# Patient Record
Sex: FEMALE | Race: WHITE | Employment: OTHER | URBAN - METROPOLITAN AREA
[De-identification: names, ages, dates, MRNs, and addresses within clinical notes are randomized per-mention and may not be internally consistent; named-entity substitution may affect disease eponyms.]

---

## 2018-12-08 ENCOUNTER — APPOINTMENT (OUTPATIENT)
Dept: GENERAL RADIOLOGY | Age: 73
DRG: 640 | End: 2018-12-08
Attending: EMERGENCY MEDICINE
Payer: MEDICARE

## 2018-12-08 ENCOUNTER — APPOINTMENT (OUTPATIENT)
Dept: CT IMAGING | Age: 73
DRG: 640 | End: 2018-12-08
Attending: EMERGENCY MEDICINE
Payer: MEDICARE

## 2018-12-08 ENCOUNTER — HOSPITAL ENCOUNTER (INPATIENT)
Age: 73
LOS: 5 days | Discharge: NURSING, REHAB OR LONG-TERM CARE | DRG: 640 | End: 2018-12-13
Attending: EMERGENCY MEDICINE | Admitting: INTERNAL MEDICINE
Payer: MEDICARE

## 2018-12-08 DIAGNOSIS — R41.3 MEMORY LOSS: ICD-10-CM

## 2018-12-08 DIAGNOSIS — R29.6 FALLS FREQUENTLY: ICD-10-CM

## 2018-12-08 DIAGNOSIS — G25.0 ESSENTIAL TREMOR: ICD-10-CM

## 2018-12-08 DIAGNOSIS — R26.9 GAIT ABNORMALITY: ICD-10-CM

## 2018-12-08 DIAGNOSIS — E87.1 HYPONATREMIA: Primary | ICD-10-CM

## 2018-12-08 DIAGNOSIS — Z96.89 S/P DEEP BRAIN STIMULATOR PLACEMENT: ICD-10-CM

## 2018-12-08 PROBLEM — R26.81 UNSTEADY GAIT: Status: ACTIVE | Noted: 2018-12-08
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
## 2018-12-08 PROBLEM — E78.5 HYPERLIPIDEMIA: Status: ACTIVE | Noted: 2018-12-08

## 2018-12-08 PROBLEM — E03.9 ACQUIRED HYPOTHYROIDISM: Status: ACTIVE | Noted: 2018-12-08

## 2018-12-08 LAB
ALBUMIN SERPL-MCNC: 4.1 G/DL (ref 3.5–5)
ALBUMIN/GLOB SERPL: 1.4 {RATIO} (ref 1.1–2.2)
ALP SERPL-CCNC: 81 U/L (ref 45–117)
ALT SERPL-CCNC: 33 U/L (ref 12–78)
ANION GAP SERPL CALC-SCNC: 10 MMOL/L (ref 5–15)
APPEARANCE UR: CLEAR
AST SERPL-CCNC: 31 U/L (ref 15–37)
BACTERIA URNS QL MICRO: NEGATIVE /HPF
BASOPHILS # BLD: 0 K/UL (ref 0–0.1)
BASOPHILS NFR BLD: 0 % (ref 0–1)
BILIRUB SERPL-MCNC: 0.4 MG/DL (ref 0.2–1)
BILIRUB UR QL: NEGATIVE
BUN SERPL-MCNC: 11 MG/DL (ref 6–20)
BUN/CREAT SERPL: 14 (ref 12–20)
CALCIUM SERPL-MCNC: 9.2 MG/DL (ref 8.5–10.1)
CHLORIDE SERPL-SCNC: 85 MMOL/L (ref 97–108)
CO2 SERPL-SCNC: 26 MMOL/L (ref 21–32)
COLOR UR: ABNORMAL
COMMENT, HOLDF: NORMAL
CREAT SERPL-MCNC: 0.76 MG/DL (ref 0.55–1.02)
DIFFERENTIAL METHOD BLD: ABNORMAL
EOSINOPHIL # BLD: 0 K/UL (ref 0–0.4)
EOSINOPHIL NFR BLD: 0 % (ref 0–7)
EPITH CASTS URNS QL MICRO: ABNORMAL /LPF
ERYTHROCYTE [DISTWIDTH] IN BLOOD BY AUTOMATED COUNT: 12.3 % (ref 11.5–14.5)
GLOBULIN SER CALC-MCNC: 3 G/DL (ref 2–4)
GLUCOSE SERPL-MCNC: 123 MG/DL (ref 65–100)
GLUCOSE UR STRIP.AUTO-MCNC: NEGATIVE MG/DL
HCT VFR BLD AUTO: 33.1 % (ref 35–47)
HGB BLD-MCNC: 11.8 G/DL (ref 11.5–16)
HGB UR QL STRIP: NEGATIVE
HYALINE CASTS URNS QL MICRO: ABNORMAL /LPF (ref 0–5)
IMM GRANULOCYTES # BLD: 0 K/UL (ref 0–0.04)
IMM GRANULOCYTES NFR BLD AUTO: 1 % (ref 0–0.5)
KETONES UR QL STRIP.AUTO: NEGATIVE MG/DL
LEUKOCYTE ESTERASE UR QL STRIP.AUTO: NEGATIVE
LYMPHOCYTES # BLD: 0.9 K/UL (ref 0.8–3.5)
LYMPHOCYTES NFR BLD: 15 % (ref 12–49)
MAGNESIUM SERPL-MCNC: 2.2 MG/DL (ref 1.6–2.4)
MCH RBC QN AUTO: 31.6 PG (ref 26–34)
MCHC RBC AUTO-ENTMCNC: 35.6 G/DL (ref 30–36.5)
MCV RBC AUTO: 88.7 FL (ref 80–99)
MONOCYTES # BLD: 0.9 K/UL (ref 0–1)
MONOCYTES NFR BLD: 14 % (ref 5–13)
NEUTS SEG # BLD: 4.4 K/UL (ref 1.8–8)
NEUTS SEG NFR BLD: 70 % (ref 32–75)
NITRITE UR QL STRIP.AUTO: NEGATIVE
NRBC # BLD: 0 K/UL (ref 0–0.01)
NRBC BLD-RTO: 0 PER 100 WBC
PH UR STRIP: 6.5 [PH] (ref 5–8)
PLATELET # BLD AUTO: 346 K/UL (ref 150–400)
PMV BLD AUTO: 8.5 FL (ref 8.9–12.9)
POTASSIUM SERPL-SCNC: 3.2 MMOL/L (ref 3.5–5.1)
PROT SERPL-MCNC: 7.1 G/DL (ref 6.4–8.2)
PROT UR STRIP-MCNC: ABNORMAL MG/DL
RBC # BLD AUTO: 3.73 M/UL (ref 3.8–5.2)
RBC #/AREA URNS HPF: ABNORMAL /HPF (ref 0–5)
SAMPLES BEING HELD,HOLD: NORMAL
SODIUM SERPL-SCNC: 121 MMOL/L (ref 136–145)
SP GR UR REFRACTOMETRY: 1.01 (ref 1–1.03)
TROPONIN I SERPL-MCNC: <0.05 NG/ML
UROBILINOGEN UR QL STRIP.AUTO: 0.2 EU/DL (ref 0.2–1)
WBC # BLD AUTO: 6.3 K/UL (ref 3.6–11)
WBC URNS QL MICRO: ABNORMAL /HPF (ref 0–4)

## 2018-12-08 PROCEDURE — 99284 EMERGENCY DEPT VISIT MOD MDM: CPT

## 2018-12-08 PROCEDURE — 93005 ELECTROCARDIOGRAM TRACING: CPT

## 2018-12-08 PROCEDURE — 80053 COMPREHEN METABOLIC PANEL: CPT

## 2018-12-08 PROCEDURE — 65270000029 HC RM PRIVATE

## 2018-12-08 PROCEDURE — 84484 ASSAY OF TROPONIN QUANT: CPT

## 2018-12-08 PROCEDURE — 36415 COLL VENOUS BLD VENIPUNCTURE: CPT

## 2018-12-08 PROCEDURE — 85025 COMPLETE CBC W/AUTO DIFF WBC: CPT

## 2018-12-08 PROCEDURE — 83735 ASSAY OF MAGNESIUM: CPT

## 2018-12-08 PROCEDURE — 70450 CT HEAD/BRAIN W/O DYE: CPT

## 2018-12-08 PROCEDURE — 71046 X-RAY EXAM CHEST 2 VIEWS: CPT

## 2018-12-08 PROCEDURE — 74011250637 HC RX REV CODE- 250/637: Performed by: INTERNAL MEDICINE

## 2018-12-08 PROCEDURE — 77030038269 HC DRN EXT URIN PURWCK BARD -A

## 2018-12-08 PROCEDURE — 81001 URINALYSIS AUTO W/SCOPE: CPT

## 2018-12-08 PROCEDURE — 83930 ASSAY OF BLOOD OSMOLALITY: CPT

## 2018-12-08 RX ORDER — FLUOXETINE HYDROCHLORIDE 20 MG/1
20 CAPSULE ORAL DAILY
Status: DISCONTINUED | OUTPATIENT
Start: 2018-12-09 | End: 2018-12-09

## 2018-12-08 RX ORDER — ENOXAPARIN SODIUM 100 MG/ML
40 INJECTION SUBCUTANEOUS EVERY 24 HOURS
Status: DISCONTINUED | OUTPATIENT
Start: 2018-12-08 | End: 2018-12-13 | Stop reason: HOSPADM

## 2018-12-08 RX ORDER — ATORVASTATIN CALCIUM 20 MG/1
40 TABLET, FILM COATED ORAL DAILY
Status: DISCONTINUED | OUTPATIENT
Start: 2018-12-09 | End: 2018-12-13 | Stop reason: HOSPADM

## 2018-12-08 RX ORDER — FLUTICASONE PROPIONATE 50 MCG
2 SPRAY, SUSPENSION (ML) NASAL
COMMUNITY

## 2018-12-08 RX ORDER — ALPRAZOLAM 0.25 MG/1
0.25 TABLET ORAL
Status: DISCONTINUED | OUTPATIENT
Start: 2018-12-08 | End: 2018-12-13 | Stop reason: HOSPADM

## 2018-12-08 RX ORDER — LEVOTHYROXINE SODIUM 100 UG/1
50 TABLET ORAL
COMMUNITY

## 2018-12-08 RX ORDER — SODIUM CHLORIDE 0.9 % (FLUSH) 0.9 %
5-10 SYRINGE (ML) INJECTION EVERY 8 HOURS
Status: DISCONTINUED | OUTPATIENT
Start: 2018-12-08 | End: 2018-12-13 | Stop reason: HOSPADM

## 2018-12-08 RX ORDER — FLUOXETINE 20 MG/1
20 TABLET ORAL DAILY
COMMUNITY
End: 2018-12-13

## 2018-12-08 RX ORDER — LEVOTHYROXINE SODIUM 100 UG/1
100 TABLET ORAL
Status: DISCONTINUED | OUTPATIENT
Start: 2018-12-09 | End: 2018-12-13 | Stop reason: HOSPADM

## 2018-12-08 RX ORDER — CLOBETASOL PROPIONATE 0.46 MG/ML
SOLUTION TOPICAL
COMMUNITY
End: 2018-12-13

## 2018-12-08 RX ORDER — PROPRANOLOL HYDROCHLORIDE 60 MG/1
60 TABLET ORAL 2 TIMES DAILY
COMMUNITY

## 2018-12-08 RX ORDER — LOPERAMIDE HYDROCHLORIDE 2 MG/1
2 CAPSULE ORAL
Status: DISCONTINUED | OUTPATIENT
Start: 2018-12-08 | End: 2018-12-13 | Stop reason: HOSPADM

## 2018-12-08 RX ORDER — LOPERAMIDE HCL 2 MG
2 TABLET ORAL
COMMUNITY

## 2018-12-08 RX ORDER — SODIUM CHLORIDE 9 MG/ML
75 INJECTION, SOLUTION INTRAVENOUS CONTINUOUS
Status: DISCONTINUED | OUTPATIENT
Start: 2018-12-08 | End: 2018-12-09

## 2018-12-08 RX ORDER — LEVOTHYROXINE SODIUM 50 UG/1
50 TABLET ORAL
Status: DISCONTINUED | OUTPATIENT
Start: 2018-12-12 | End: 2018-12-13 | Stop reason: HOSPADM

## 2018-12-08 RX ORDER — FLUTICASONE PROPIONATE 50 MCG
2 SPRAY, SUSPENSION (ML) NASAL
Status: DISCONTINUED | OUTPATIENT
Start: 2018-12-08 | End: 2018-12-13 | Stop reason: HOSPADM

## 2018-12-08 RX ORDER — POTASSIUM CHLORIDE 750 MG/1
40 TABLET, FILM COATED, EXTENDED RELEASE ORAL
Status: COMPLETED | OUTPATIENT
Start: 2018-12-08 | End: 2018-12-08

## 2018-12-08 RX ORDER — SODIUM CHLORIDE 0.9 % (FLUSH) 0.9 %
5-10 SYRINGE (ML) INJECTION AS NEEDED
Status: DISCONTINUED | OUTPATIENT
Start: 2018-12-08 | End: 2018-12-13 | Stop reason: HOSPADM

## 2018-12-08 RX ADMIN — POTASSIUM CHLORIDE 40 MEQ: 750 TABLET, EXTENDED RELEASE ORAL at 22:03

## 2018-12-08 NOTE — ED TRIAGE NOTES
Pt c/o balance problems, multiple falls (last one was Thursday a week ago). Per daughter, pt has been experiencing gradually worsening short term memory problems

## 2018-12-08 NOTE — ED PROVIDER NOTES
68 y.o. female with past medical history significant for HTN, thyroid disease (on Synthroid), hypercholesterolemia, osteopenia, asthma, s/p pacemaker, s/p brain implant for essential tremor presents accompanied by brother and daughter with chief complaint of worsening balance problems and multiple falls, noting that the last one was on 11/29/18. Pt explains that the falls and balance problems started in late Fall, noting that she has fallen about 4-5 times, and that she injured her back and hit her head during the last fall. Pt reports associated right upper cheek bruising after her last fall. She also reports associated BLE weakness. Family indicates that the pt's memory has been decreasing over the last few months. Family further explains that the pt had almost fallen today, but was caught before hitting the ground. Pt daughter adds that the pt went to the chiropractor this week for her back pain. 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 Pt lives alone. Pt denies any vision changes, epistaxis, facial pain, or LE pain currently. There are no other acute medical concerns at this time. Social hx: Patient denies Tobacco use. Reports 2.5 oz/week of EtOH use. Denies illicit drug abuse. PCP: Dr. Cyndi Daniels MD 
 
Note written by Pushpa Zurita, as dictated by Hal Henry NP, 7:21 PM 
 
 
 
The history is provided by the patient and a relative. No  was used. 76y F with long-standing balance issues here with AMS. Family reports worsening short-term memory for \"some time\" but worse since a fall about 1.5 weeks ago. Has intermittent periods of confusion. Will ramble on about past event, seems like she is in daze. No vomiting. No fever. No recent illnesses. No focal weakness or numbness.  Has been evaluated for balance issues in the past and previously was in balance therapy, but not currently. Nothing makes sx's better or worse. Past Medical History:  
Diagnosis Date  Asthma   
 as a child  Hypercholesterolemia  Hypertension  Ill-defined condition \"essential tremors with implant in chest on Left side under collar bone connected to the brain\"  Nausea & vomiting  Other ill-defined conditions(799.89)   
  high cholesterol, osteopenia  Psychiatric disorder   
 anxiety  Thyroid disease   
 hypothryroidism Past Surgical History:  
Procedure Laterality Date  ABDOMEN SURGERY PROC UNLISTED  1981  
 partial abdominal Hysterectomy, bladder tacking,  
 HX APPENDECTOMY  HX BUNIONECTOMY  HX ORTHOPAEDIC  1986  
 neck fusion,  
 HX ORTHOPAEDIC Right Bunionectomy  HX PACEMAKER Implanted device to L chest that Dr. Annie Pinedo at [facility] put in for essential tremors  HX TONSILLECTOMY No family history on file. Social History Socioeconomic History  Marital status: SINGLE Spouse name: Not on file  Number of children: Not on file  Years of education: Not on file  Highest education level: Not on file Social Needs  Financial resource strain: Not on file  Food insecurity - worry: Not on file  Food insecurity - inability: Not on file  Transportation needs - medical: Not on file  Transportation needs - non-medical: Not on file Occupational History  Not on file Tobacco Use  Smoking status: Never Smoker  Smokeless tobacco: Never Used Substance and Sexual Activity  Alcohol use: Yes Alcohol/week: 2.5 oz Types: 5 Glasses of wine per week Comment: 5-6 wine/week  Drug use: No  
 Sexual activity: Not on file Other Topics Concern  Not on file Social History Narrative ** Merged History Encounter ** ALLERGIES: Latex, natural rubber and Latex Review of Systems Constitutional: Negative for appetite change, chills and fever. HENT: Negative. Negative for nosebleeds. Negative for facial pain. Eyes: Negative for visual disturbance. Respiratory: Negative for cough, shortness of breath and wheezing. Cardiovascular: Negative for chest pain. Gastrointestinal: Negative for abdominal pain, constipation, diarrhea, nausea and vomiting. Genitourinary: Negative for dysuria and urgency. Musculoskeletal: Positive for back pain and gait problem (secondary to weakness). Negative for myalgias. Skin: Positive for wound (right upper cheek bruise). Negative for color change and rash. Neurological: Positive for weakness. Negative for dizziness and headaches. Positive for balance problem. Psychiatric/Behavioral: Negative. Positive for decreased memory. All other systems reviewed and are negative. Vitals:  
 12/08/18 1830 BP: 153/90 Pulse: 69 Resp: 18 Temp: 98.7 °F (37.1 °C) SpO2: 99% Weight: 50.8 kg (112 lb) Height: 5' 2\" (1.575 m) Physical Exam  
Constitutional: She is oriented to person, place, and time. She appears well-developed and well-nourished. HENT:  
Head: Normocephalic and atraumatic. Neck: Normal range of motion. Neck supple. Cardiovascular: Normal rate, regular rhythm, normal heart sounds and intact distal pulses. Pulmonary/Chest: Effort normal and breath sounds normal. No respiratory distress. She has no wheezes. She has no rales. She exhibits no tenderness. Abdominal: Soft. Bowel sounds are normal. There is no tenderness. There is no guarding. Musculoskeletal: Normal range of motion. Neurological: She is alert and oriented to person, place, and time. She has normal strength. She displays tremor (left hand). No cranial nerve deficit or sensory deficit. GCS eye subscore is 4. GCS verbal subscore is 5. GCS motor subscore is 6. Non-focal neuro exam. Negative heel/shin and finger nose. Left hand is shaking due to essential tremor. Skin: Skin is warm and dry. No erythema. Psychiatric: She has a normal mood and affect. Her behavior is normal. Judgment and thought content normal. Her speech is delayed. She is not actively hallucinating. She exhibits abnormal recent memory. Does have word finding problems. She is attentive. Nursing note and vitals reviewed. [facility] Procedures Assessment & Plan:  
 
Orders Placed This Encounter  CT HEAD WITHOUT CONTRAST  XR CHEST PA LAT  CBC WITH AUTOMATED DIFF  
 METABOLIC PANEL, COMPREHENSIVE  
 URINALYSIS W/MICROSCOPIC  TROPONIN I  
 Hold Sample  EKG 12 LEAD INITIAL Discussed with Sade Wilson, Juanito,ED Provider Mckenna Darden NP 
12/08/18 
7:20 PM 
 
Sodium is 121. Potassium 3.2. CT head with chronic white ,atter disease. Add on Magnesium. CXR with T10 wedging. Patient does have mild pain in this area on exam.  
 
Mckenna Darden NP 
12/08/18 
8:09 PM 
 
8:33 PM 
Patient is being admitted to the hospital.  The results of their tests and reasons for their admission have been discussed with them and/or available family. They convey agreement and understanding for the need to be admitted and for their admission diagnosis. Consultation has been made with the inpatient physician specialist for hospitalization. LABORATORY TESTS: 
Recent Results (from the past 12 hour(s)) CBC WITH AUTOMATED DIFF Collection Time: 12/08/18  6:36 PM  
Result Value Ref Range WBC 6.3 3.6 - 11.0 K/uL  
 RBC 3.73 (L) 3.80 - 5.20 M/uL  
 HGB 11.8 11.5 - 16.0 g/dL HCT 33.1 (L) 35.0 - 47.0 % MCV 88.7 80.0 - 99.0 FL  
 MCH 31.6 26.0 - 34.0 PG  
 MCHC 35.6 30.0 - 36.5 g/dL  
 RDW 12.3 11.5 - 14.5 % PLATELET 523 290 - 175 K/uL MPV 8.5 (L) 8.9 - 12.9 FL  
 NRBC 0.0 0  WBC ABSOLUTE NRBC 0.00 0.00 - 0.01 K/uL NEUTROPHILS 70 32 - 75 % LYMPHOCYTES 15 12 - 49 % MONOCYTES 14 (H) 5 - 13 % EOSINOPHILS 0 0 - 7 % BASOPHILS 0 0 - 1 % IMMATURE GRANULOCYTES 1 (H) 0.0 - 0.5 % ABS. NEUTROPHILS 4.4 1.8 - 8.0 K/UL  
 ABS. LYMPHOCYTES 0.9 0.8 - 3.5 K/UL  
 ABS. MONOCYTES 0.9 0.0 - 1.0 K/UL  
 ABS. EOSINOPHILS 0.0 0.0 - 0.4 K/UL  
 ABS. BASOPHILS 0.0 0.0 - 0.1 K/UL  
 ABS. IMM. GRANS. 0.0 0.00 - 0.04 K/UL  
 DF AUTOMATED METABOLIC PANEL, COMPREHENSIVE Collection Time: 12/08/18  6:36 PM  
Result Value Ref Range Sodium 121 (L) 136 - 145 mmol/L Potassium 3.2 (L) 3.5 - 5.1 mmol/L Chloride 85 (L) 97 - 108 mmol/L  
 CO2 26 21 - 32 mmol/L Anion gap 10 5 - 15 mmol/L Glucose 123 (H) 65 - 100 mg/dL BUN 11 6 - 20 MG/DL Creatinine 0.76 0.55 - 1.02 MG/DL  
 BUN/Creatinine ratio 14 12 - 20 GFR est AA >60 >60 ml/min/1.73m2 GFR est non-AA >60 >60 ml/min/1.73m2 Calcium 9.2 8.5 - 10.1 MG/DL Bilirubin, total 0.4 0.2 - 1.0 MG/DL  
 ALT (SGPT) 33 12 - 78 U/L  
 AST (SGOT) 31 15 - 37 U/L Alk. phosphatase 81 45 - 117 U/L Protein, total 7.1 6.4 - 8.2 g/dL Albumin 4.1 3.5 - 5.0 g/dL Globulin 3.0 2.0 - 4.0 g/dL A-G Ratio 1.4 1.1 - 2.2    
TROPONIN I Collection Time: 12/08/18  6:36 PM  
Result Value Ref Range Troponin-I, Qt. <0.05 <0.05 ng/mL URINALYSIS W/MICROSCOPIC Collection Time: 12/08/18  6:59 PM  
Result Value Ref Range Color YELLOW/STRAW Appearance CLEAR CLEAR Specific gravity 1.013 1.003 - 1.030    
 pH (UA) 6.5 5.0 - 8.0 Protein TRACE (A) NEG mg/dL Glucose NEGATIVE  NEG mg/dL Ketone NEGATIVE  NEG mg/dL Bilirubin NEGATIVE  NEG Blood NEGATIVE  NEG Urobilinogen 0.2 0.2 - 1.0 EU/dL Nitrites NEGATIVE  NEG Leukocyte Esterase NEGATIVE  NEG    
 WBC 0-4 0 - 4 /hpf  
 RBC 0-5 0 - 5 /hpf Epithelial cells FEW FEW /lpf Bacteria NEGATIVE  NEG /hpf Hyaline cast 0-2 0 - 5 /lpf SAMPLES BEING HELD Collection Time: 12/08/18  6:59 PM  
Result Value Ref Range [facility]ING [facility]ELD [facility]   
 COMMENT Add-on orders for these samples will be processed based on acceptable specimen integrity and analyte stability, which may vary by analyte. IMAGING RESULTS: 
XR CHEST PA LAT Final Result Impression: 1. No acute cardiopulmonary disease 2. Interval wedging of approximately T10. Correlate for point tenderness in this  
region CT HEAD WO CONT Final Result IMPRESSION:  
  
Left frontal deep brain stimulator. Mild white matter disease. No acute  
intracranial abnormality Xr Chest Pa Lat Result Date: 12/8/2018 INDICATION:  confusion Exam: Chest 2 views. Comparison: None. Findings: Cardiomediastinal silhouette is normal. Pulmonary vasculature is not engorged. No focal parenchymal opacities, effusions, or pneumothorax. Interval placement of a left chest generator with wires extending into the left neck. There is interval wedging of approximately T10. Sonido Saldana Impression: 1. No acute cardiopulmonary disease 2. Interval wedging of approximately T10. Correlate for point tenderness in this region Ct Head Wo Cont Result Date: 12/8/2018 EXAM:  CT HEAD WO CONT INDICATION:   fall, increasing confusion COMPARISON: None. CONTRAST:  None. TECHNIQUE: Unenhanced CT of the head was performed using 5 mm images. Brain and bone windows were generated. CT dose reduction was achieved through use of a standardized protocol tailored for this examination and automatic exposure control for dose modulation. FINDINGS: The ventricles and sulci are normal in size, shape and configuration and midline. There is a left frontal deep brain stimulator. Mild low density within the periventricular white matter. There is no intracranial hemorrhage, extra-axial collection, mass, mass effect or midline shift. The basilar cisterns are open. No acute infarct is identified. The bone windows demonstrate no abnormalities. The visualized portions of the paranasal sinuses and mastoid air cells are clear. IMPRESSION: Left frontal deep brain stimulator. Mild white matter disease. No acute intracranial abnormality MEDICATIONS GIVEN: 
Medications - No data to display IMPRESSION: 
1. Hyponatremia PLAN: 
1.  Admit to hospitalist 
 
 
Ruddy Andersen NP

## 2018-12-09 ENCOUNTER — APPOINTMENT (OUTPATIENT)
Dept: CT IMAGING | Age: 73
DRG: 640 | End: 2018-12-09
Attending: PSYCHIATRY & NEUROLOGY
Payer: MEDICARE

## 2018-12-09 ENCOUNTER — APPOINTMENT (OUTPATIENT)
Dept: CT IMAGING | Age: 73
DRG: 640 | End: 2018-12-09
Attending: NURSE PRACTITIONER
Payer: MEDICARE

## 2018-12-09 LAB
ANION GAP SERPL CALC-SCNC: 11 MMOL/L (ref 5–15)
ANION GAP SERPL CALC-SCNC: 8 MMOL/L (ref 5–15)
ATRIAL RATE: 65 BPM
BUN SERPL-MCNC: 10 MG/DL (ref 6–20)
BUN SERPL-MCNC: 9 MG/DL (ref 6–20)
BUN/CREAT SERPL: 13 (ref 12–20)
BUN/CREAT SERPL: 13 (ref 12–20)
CALCIUM SERPL-MCNC: 8.1 MG/DL (ref 8.5–10.1)
CALCIUM SERPL-MCNC: 9.2 MG/DL (ref 8.5–10.1)
CALCULATED P AXIS, ECG09: 64 DEGREES
CALCULATED R AXIS, ECG10: 43 DEGREES
CALCULATED T AXIS, ECG11: 43 DEGREES
CHLORIDE SERPL-SCNC: 90 MMOL/L (ref 97–108)
CHLORIDE SERPL-SCNC: 93 MMOL/L (ref 97–108)
CO2 SERPL-SCNC: 24 MMOL/L (ref 21–32)
CO2 SERPL-SCNC: 26 MMOL/L (ref 21–32)
CREAT SERPL-MCNC: 0.67 MG/DL (ref 0.55–1.02)
CREAT SERPL-MCNC: 0.77 MG/DL (ref 0.55–1.02)
DIAGNOSIS, 93000: NORMAL
ERYTHROCYTE [DISTWIDTH] IN BLOOD BY AUTOMATED COUNT: 12.4 % (ref 11.5–14.5)
GLUCOSE SERPL-MCNC: 134 MG/DL (ref 65–100)
GLUCOSE SERPL-MCNC: 141 MG/DL (ref 65–100)
HCT VFR BLD AUTO: 34.4 % (ref 35–47)
HGB BLD-MCNC: 12.4 G/DL (ref 11.5–16)
MCH RBC QN AUTO: 32 PG (ref 26–34)
MCHC RBC AUTO-ENTMCNC: 36 G/DL (ref 30–36.5)
MCV RBC AUTO: 88.7 FL (ref 80–99)
NRBC # BLD: 0 K/UL (ref 0–0.01)
NRBC BLD-RTO: 0 PER 100 WBC
OSMOLALITY SERPL: 252 MOSM/KG H2O
P-R INTERVAL, ECG05: 162 MS
PLATELET # BLD AUTO: 372 K/UL (ref 150–400)
PMV BLD AUTO: 8.8 FL (ref 8.9–12.9)
POTASSIUM SERPL-SCNC: 3.6 MMOL/L (ref 3.5–5.1)
POTASSIUM SERPL-SCNC: 3.8 MMOL/L (ref 3.5–5.1)
Q-T INTERVAL, ECG07: 442 MS
QRS DURATION, ECG06: 82 MS
QTC CALCULATION (BEZET), ECG08: 459 MS
RBC # BLD AUTO: 3.88 M/UL (ref 3.8–5.2)
SODIUM SERPL-SCNC: 125 MMOL/L (ref 136–145)
SODIUM SERPL-SCNC: 127 MMOL/L (ref 136–145)
TSH SERPL DL<=0.05 MIU/L-ACNC: 2.65 UIU/ML (ref 0.36–3.74)
VENTRICULAR RATE, ECG03: 65 BPM
WBC # BLD AUTO: 4.9 K/UL (ref 3.6–11)

## 2018-12-09 PROCEDURE — 74011000258 HC RX REV CODE- 258: Performed by: INTERNAL MEDICINE

## 2018-12-09 PROCEDURE — 84300 ASSAY OF URINE SODIUM: CPT

## 2018-12-09 PROCEDURE — 74011250637 HC RX REV CODE- 250/637: Performed by: INTERNAL MEDICINE

## 2018-12-09 PROCEDURE — 80048 BASIC METABOLIC PNL TOTAL CA: CPT

## 2018-12-09 PROCEDURE — 74011250636 HC RX REV CODE- 250/636: Performed by: INTERNAL MEDICINE

## 2018-12-09 PROCEDURE — 82436 ASSAY OF URINE CHLORIDE: CPT

## 2018-12-09 PROCEDURE — 84443 ASSAY THYROID STIM HORMONE: CPT

## 2018-12-09 PROCEDURE — 36415 COLL VENOUS BLD VENIPUNCTURE: CPT

## 2018-12-09 PROCEDURE — 85027 COMPLETE CBC AUTOMATED: CPT

## 2018-12-09 PROCEDURE — 65270000029 HC RM PRIVATE

## 2018-12-09 PROCEDURE — 84133 ASSAY OF URINE POTASSIUM: CPT

## 2018-12-09 PROCEDURE — 72125 CT NECK SPINE W/O DYE: CPT

## 2018-12-09 PROCEDURE — 83935 ASSAY OF URINE OSMOLALITY: CPT

## 2018-12-09 RX ORDER — PROPRANOLOL HYDROCHLORIDE 10 MG/1
40 TABLET ORAL 2 TIMES DAILY
Status: DISCONTINUED | OUTPATIENT
Start: 2018-12-09 | End: 2018-12-13 | Stop reason: HOSPADM

## 2018-12-09 RX ORDER — ACETAMINOPHEN 325 MG/1
650 TABLET ORAL
Status: DISCONTINUED | OUTPATIENT
Start: 2018-12-09 | End: 2018-12-13 | Stop reason: HOSPADM

## 2018-12-09 RX ORDER — TRAMADOL HYDROCHLORIDE 50 MG/1
50 TABLET ORAL EVERY 6 HOURS
Status: DISCONTINUED | OUTPATIENT
Start: 2018-12-09 | End: 2018-12-09

## 2018-12-09 RX ORDER — SODIUM CHLORIDE 450 MG/100ML
50 INJECTION, SOLUTION INTRAVENOUS CONTINUOUS
Status: DISCONTINUED | OUTPATIENT
Start: 2018-12-09 | End: 2018-12-10

## 2018-12-09 RX ORDER — PROPRANOLOL HYDROCHLORIDE 10 MG/1
20 TABLET ORAL 2 TIMES DAILY
Status: DISCONTINUED | OUTPATIENT
Start: 2018-12-09 | End: 2018-12-13 | Stop reason: HOSPADM

## 2018-12-09 RX ORDER — TRAMADOL HYDROCHLORIDE 50 MG/1
50 TABLET ORAL
Status: DISCONTINUED | OUTPATIENT
Start: 2018-12-09 | End: 2018-12-13 | Stop reason: HOSPADM

## 2018-12-09 RX ADMIN — ENOXAPARIN SODIUM 40 MG: 40 INJECTION SUBCUTANEOUS at 23:24

## 2018-12-09 RX ADMIN — PROPRANOLOL HYDROCHLORIDE 60 MG: 10 TABLET ORAL at 00:30

## 2018-12-09 RX ADMIN — ENOXAPARIN SODIUM 40 MG: 40 INJECTION SUBCUTANEOUS at 00:29

## 2018-12-09 RX ADMIN — ACETAMINOPHEN 650 MG: 325 TABLET, FILM COATED ORAL at 11:08

## 2018-12-09 RX ADMIN — Medication 10 ML: at 06:07

## 2018-12-09 RX ADMIN — Medication 10 ML: at 00:29

## 2018-12-09 RX ADMIN — SODIUM CHLORIDE 50 ML/HR: 450 INJECTION, SOLUTION INTRAVENOUS at 16:29

## 2018-12-09 RX ADMIN — PROPRANOLOL HYDROCHLORIDE 20 MG: 10 TABLET ORAL at 23:24

## 2018-12-09 RX ADMIN — ACETAMINOPHEN 650 MG: 325 TABLET, FILM COATED ORAL at 00:42

## 2018-12-09 RX ADMIN — LEVOTHYROXINE SODIUM 100 MCG: 100 TABLET ORAL at 06:07

## 2018-12-09 RX ADMIN — SODIUM CHLORIDE 75 ML/HR: 900 INJECTION, SOLUTION INTRAVENOUS at 00:28

## 2018-12-09 RX ADMIN — PROPRANOLOL HYDROCHLORIDE 20 MG: 10 TABLET ORAL at 09:31

## 2018-12-09 RX ADMIN — ALPRAZOLAM 0.25 MG: 0.25 TABLET ORAL at 14:20

## 2018-12-09 RX ADMIN — ATORVASTATIN CALCIUM 40 MG: 10 TABLET, FILM COATED ORAL at 09:31

## 2018-12-09 RX ADMIN — Medication 5 ML: at 14:00

## 2018-12-09 RX ADMIN — PROPRANOLOL HYDROCHLORIDE 40 MG: 10 TABLET ORAL at 23:24

## 2018-12-09 RX ADMIN — PROPRANOLOL HYDROCHLORIDE 40 MG: 10 TABLET ORAL at 09:33

## 2018-12-09 RX ADMIN — SODIUM CHLORIDE 75 ML/HR: 900 INJECTION, SOLUTION INTRAVENOUS at 14:23

## 2018-12-09 RX ADMIN — ALPRAZOLAM 0.25 MG: 0.25 TABLET ORAL at 00:42

## 2018-12-09 RX ADMIN — LOPERAMIDE HYDROCHLORIDE 2 MG: 2 CAPSULE ORAL at 23:27

## 2018-12-09 RX ADMIN — TRAMADOL HYDROCHLORIDE 50 MG: 50 TABLET, FILM COATED ORAL at 18:29

## 2018-12-09 NOTE — CONSULTS
[address] Yang  MR#: [id number]  : 1945  ACCOUNT #: [de-identified]   DATE OF SERVICE: 2018    NEPHROLOGY CONSULTATION    DATE OF CONSULTATION:  2018    REFERRING PHYSICIAN:  Wilfredo Julian MD    CHIEF COMPLAINT:  Hyponatremia. HISTORY OF PRESENT ILLNESS:  The patient is a 70-year-old white female who is admitted to the hospital with worsening memory loss and falls. Her sodium was 121 on admission. She has a prior history of tremor and apparently had a deep brain stimulator placed in the past.  Prior to her surgery, she was advised that her sodium was low and was prescribed salt tablets for a couple of days prior to surgery. Apparently, her sodium improved and she proceeded with surgery. She has not been on salt tablets for the past year and a half. It does not appear that she has been restricting fluid at home; though, and she has been advised by various individuals to push fluid in an effort to promote help. Here in the hospital, she was started on fluid restriction last night and normal saline at 75 mL per hour and her sodium has trended up from 121 to 127. She has a history of hypothyroidism, which is treated with levothyroxine and her TSH is normal.  She is on an SSRI at home, Prozac, but it is not taking it here in the hospital.    REVIEW OF SYSTEMS:  CONSTITUTIONAL:  No fevers or chills. GASTROINTESTINAL:  No nausea, vomiting. GENITOURINARY:  She is voiding well and no hematuria. NEUROLOGIC:  She has had confusion. She has had falls and weakness. No seizures. All other symptoms reviewed and are negative except as per history of present illness. PAST MEDICAL HISTORY:  Tremor, hypertension, hyperlipidemia, hypothyroidism, anxiety, memory loss. FAMILY HISTORY:  Her father had kidney stones. No significant family history of chronic kidney disease. SOCIAL HISTORY:  She does not smoke.   She reports wine intake of 5 glasses per week. PHYSICAL EXAMINATION:  VITAL SIGNS:  Temperature 97.7, pulse 72, blood pressure 138/69. GENERAL:  Thin white female in no acute distress. HEENT:  Anicteric. Extraocular movements intact. ENT:  Mucous membranes are moist.  There is no epistaxis. NECK:  Nontender. There is no JVD. HEART:  Regular. There is no lower extremity edema. RESPIRATORY:  Lungs are clear with fair effort. ABDOMEN:  Soft, nontender. Bowel sounds are active. Hepatosplenomegaly is not appreciated. SKIN:  Warm. Normal turgor. There is no rash. MUSCULOSKELETAL:  There is no cyanosis or clubbing. There is no synovitis of the fingers or wrists bilaterally. LABORATORY DATA:  Sodium is 127. TSH is 2.65, hemoglobin is 12.4    RADIOGRAPHIC STUDIES:  Chest x-ray shows no acute cardiopulmonary disease. ASSESSMENT:  This is a 68-year-old female with recurrent hyponatremia. I suspect she has fairly mild syndrome of inappropriate secretion of antidiuretic hormone exacerbated by excessive fluid intake. She is being treated with fluid restriction at present and her sodium is correcting nicely. She is on saline at low rate as well. PLAN:  1. Continue fluid restriction. 2.  Change to hypotonic saline and reduce rate as her correction has been adequate. 3.  Check cortisol and urine electrolytes. 4.  Patient will need to be advised on fluid restriction and I would advise abstinence from alcohol, which may be exacerbating the problem. 5.  Agree with stopping the selective serotonin reuptake inhibitor. Would consider transition to serotonin and norepinephrine reuptake inhibitors such as Cymbalta if antidepressant therapy is needed. 6.  Continue to monitor labs and will follow up with you to assist in her management.       MD Dong Davison / Gene.Concepcion  D: 12/09/2018 16:08     T: 12/09/2018 16:53  JOB #: [id number]  CC: Lou Shepard MD

## 2018-12-09 NOTE — ED NOTES
5th floor charge nurse Brad Lees RN called regarding room assignment received. This relayed to Yasemin Sheets [facility] 15. primary nurse.

## 2018-12-09 NOTE — H&P
[facility] [address] [facility], [facility] [address] 
[phone] Admission History and Physical 
 
 
NAME:  Saad Mansfield :   1945 MRN:  [id number] PCP:  Gurpreet Bello MD  
 
Date/Time:  2018 Subjective: CHIEF COMPLAINT: fall, worsening memory. HISTORY OF PRESENT ILLNESS:    
Ms. Delmi Barker is a 68 y.o.  female who is admitted with hyponatremia. Ms. Vazquez with PMH of hyponatremia, hyperlipidemia, HTN, hypothyroidism, depression presented to ER c/o unsteady gait, falls and worsening memory. The fall started about a year ago and has had 5 falls since then. The last fall was about 10 days ago. Pt has unsteady gait and c/o BL leg weakness, which lead to her falls. Denies dizziness. Pt has deep brain stimulator for tremor. Pt has been having difficulty with her short term memory, which has been going on for more than a year, but after her last fall her memory was getting worse. Was seen by her PCP and recommended to see a neurologist for dementia workup. Has Hx of hyponatremia and takes salt tabs. Has chronic diarrhea Pt lives by herself and her daughter found her taking random medication not identifying which medication she has to take and also multiple pill bottles opened and scattered all over the place. Past Medical History:  
Diagnosis Date  Asthma   
 as a child  Hypercholesterolemia  Hypertension  Ill-defined condition \"essential tremors with implant in chest on Left side under collar bone connected to the brain\"  Nausea & vomiting  Other ill-defined conditions(879.69)   
  high cholesterol, osteopenia  Psychiatric disorder   
 anxiety  Thyroid disease   
 hypothryroidism  Tremor Past Surgical History:  
Procedure Laterality Date  ABDOMEN SURGERY PROC UNLISTED    
 partial abdominal Hysterectomy, bladder tacking,  
 HX APPENDECTOMY  HX BUNIONECTOMY  HX ORTHOPAEDIC  1986  
 neck fusion,  
 HX ORTHOPAEDIC Right Bunionectomy  HX PACEMAKER Deep brain stim Implanted device to L chest that Dr. Jostin Tapia at [facility] put in for essential tremors  HX TONSILLECTOMY Social History Tobacco Use  Smoking status: Never Smoker  Smokeless tobacco: Never Used Substance Use Topics  Alcohol use: Yes Alcohol/week: 2.5 oz Types: 5 Glasses of wine per week Comment: 5-6 wine/week No family history on file. Allergies Allergen Reactions  Latex, Natural Rubber Itching  Latex Other (comments)  
  itching Prior to Admission medications Medication Sig Start Date End Date Taking? Authorizing Provider  
diphenoxylate-atropine (LOMOTIL) 2.5-0.025 mg per tablet Take 1 Tab by mouth four (4) times daily as needed for Diarrhea. Provider, Historical  
ALPRAZolam (XANAX) 0.25 mg tablet Take 0.25 mg by mouth nightly as needed for Anxiety. Provider, Historical  
atorvastatin (LIPITOR) 40 mg tablet Take 40 mg by mouth daily. Provider, Historical  
ibandronate (BONIVA) 150 mg tablet Take 150 mg by mouth every thirty (30) days. Provider, Historical  
Omega-3-DHA-EPA-Fish Oil 1,000 (120-180) mg cap Take  by mouth. Provider, Historical  
propranolol LA (INDERAL LA) 60 mg SR capsule Take 60 mg by mouth two (2) times a day. Provider, Historical  
levothyroxine (SYNTHROID) 50 mcg tablet Take 100 mcg by mouth Daily (before breakfast). Every 4 days pt takes a half of the 100 mcg tablet    Provider, Historical  
magnesium 250 mg Tab Take 250 mg by mouth two (2) times a day. Provider, Historical  
vitamin E (AQUA GEMS) 400 unit capsule Take 400 Units by mouth daily. Indications: preventive    Provider, Historical  
coenzyme q10-vitamin e (COQ10 ) 100-100 mg-unit cap Take 100 mg by mouth daily. Provider, Historical  
 
 
 
Review of Systems: 
(bold if positive, if negative) Gen:  Eyes:  ENT:  CVS:  Pulm:  GI:   
:   
MS:  Skin:  Psych:  Endo:   
Hem:  Renal:   
Neuro:  weaknesstremors Objective: VITALS:   
Vital signs reviewed; most recent are: 
 
Visit Vitals /90 (BP 1 Location: Right arm, BP Patient Position: Sitting) Pulse 69 Temp 98.7 °F (37.1 °C) Resp 18 Ht 5' 2\" (1.575 m) Wt 50.8 kg (112 lb) SpO2 99% BMI 20.49 kg/m² SpO2 Readings from Last 6 Encounters:  
12/08/18 99% 05/28/14 100% 11/01/12 100% No intake or output data in the 24 hours ending 12/08/18 2114 Exam:  
 
Physical Exam: 
 
Gen:  Well-developed, well-nourished, in no acute distress HEENT:  Pink conjunctivae, PERRL, hearing intact to voice, moist mucous membranes Neck:  Supple, without masses, thyroid non-tender Resp:  No accessory muscle use, clear breath sounds without wheezes rales or rhonchi 
Card:  No murmurs, normal S1, S2 without thrills, bruits or peripheral edema Abd:  Soft, non-tender, non-distended, normoactive bowel sounds are present, no palpable organomegaly and no detectable hernias Lymph:  No cervical or inguinal adenopathy Musc:  No cyanosis or clubbing Skin:  No rashes or ulcers, skin turgor is good Neuro:  Cranial nerves are grossly intact, no focal motor weakness, follows commands appropriately Psych:  poor insight,   
 
  
Labs: 
 
Recent Labs 12/08/18 
1836 WBC 6.3 HGB 11.8 HCT 33.1*  
 Recent Labs 12/08/18 
1836 *  
K 3.2*  
CL 85* CO2 26 * BUN 11  
CREA 0.76 CA 9.2 MG 2.2 ALB 4.1 TBILI 0.4 SGOT 31 ALT 33 Lab Results Component Value Date/Time Glucose (POC) 86 08/03/2009 08:37 AM  
 
No results for input(s): PH, PCO2, PO2, HCO3, FIO2 in the last 72 hours. No results for input(s): INR in the last 72 hours. No lab exists for component: INREXT Telemetry reviewed:     
 
  
Assessment/Plan: 1. Hyponatremia (12/8/2018). This is chronic.  Check urine sodium and osmolality, serum osmolality, TSH. Trial of NS IVF for possible volume depletion ( has chronic diarrhea). Consult nephrology 2.  unsteady gait/ Fall (12/8/2018)/ BL leg weakness. CT scan of the head is without acute finding. Has deep brain stimulator. Consult neurology. PT/OT evaluation. Not sure if the brain stimulator is compatable with MRI. Fall precaution 3. Confusion/ short term memory loss. Possible developing dementia vs medication( narcotics and benzo use). According to her daughter, pt lives by herself and found her with open medication bottles everywhere and pt couldn't remember and doesn't know which is which. 4.  Hyperlipidemia (12/8/2018). Continue statin 5. Acquired hypothyroidism (12/8/2018). Continue synthroid and check TSH 6. Tremor. Continue propranolol. Has deep brain stimulator. 7.  Anxiety/ depression. On xanax. 8.  Hypokalemia. Likely due to diarrhea. Replete. 9.  Wedging of T10 on xray. This is due to her frequent falls. Consult orthopedics. 10.  Chronic diarrhea. Has full workup with her PCP and according to pt and daughter, workup was negative. May need to see a gastroenterologist as outpatient. For now continue imodium. Previous medical records reviewed Risk of deterioration: high Total time spent with patient: 70 Minutes Care Plan discussed with: Patient, Family, Nursing Staff and >50% of time spent in counseling and coordination of care Discussed:  Care Plan Prophylaxis:  Lovenox Probable Disposition:  SNF/LTC 
        
___________________________________________________ Attending Physician: Mandy Rivera MD

## 2018-12-09 NOTE — CONSULTS
Consult dictated    Likely has mild SIAD and excessive water intake. Improving on NS presently. Continue fluid restriction. Likely needs to be advised to restrict fluid after d/c. Should stop the SSRI and use SNRI if needed for depression.     Routine eval of hyponatremia

## 2018-12-09 NOTE — PROGRESS NOTES
Bedside and Verbal shift change report given to Ellis Buck (oncoming nurse) by Bonnie Hill (offgoing nurse). Report included the following information SBAR, Kardex, Intake/Output, MAR, Accordion and Recent Results.

## 2018-12-09 NOTE — ED NOTES
Bedside and Verbal shift change report given to Adwoa Sutton (oncoming nurse) by Vidhya Dudley (offgoing nurse). Report included the following information SBAR, Kardex, ED Summary, [facility] and Recent Results.

## 2018-12-09 NOTE — PROGRESS NOTES
[facility] [place] 79 
5[address] [facility], [address] [place] [address] 
[phone] Medical Progress Note NAME: Saad Bay :  1945 MRM:  [id number] Date/Time: 2018 Assessment / Plan: Hyponatremia: chronic, but worse and moderately severe on admission. 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 TSH WNR. Check urine osm, Na, Cl. Will fluid restrict 1800ml/day and hold pt's SSRI (Prozac). She apparently was on salt tabs at one point. Follow BMP closely. Nephrology consult Unsteady gait/ Fall ()/ BL leg weakness: frequent falls. Head CT showed no acute process. 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 No diagnosis of Parkinson's. More recently has been walking with a shuffling gait. Fall precautions, PT/OT, neuro consult 
  
Confusion/ short term memory loss. Possible developing dementia vs medication  (narcotics and benzo use). According to her daughter, pt lives by herself and found her with open medication bottles everywhere and pt couldn't remember and doesn't know which is which. Mentating well this morning.  
  
Hyperlipidemia: cont statin 
  
Acquired hypothyroidism (): TSH WNR. Continue LT4 
  
Raghavendra tremors: Continue propranolol, deep brain stimulator implanted as above 
  
Anxiety/ depression: Xanax PRN would NOT be a good long-term choice for her, and her SSRI is held due to possible SIADH. Will ask psych to evaluate tomorrow 
  
Hypokalemia: Likely due to diarrhea. Replace and follow 
  Wedging of T10 on xray: likely due to frequent falls. Orthopedics consulted 
  
Chronic diarrhea: reportedly had ull workup with her PCP and according to pt and daughter. Would follow up outpatient. On imodium.  
  
 
Total time spent: 40 minutes Time spent in the care of this patient including reviewing records, discussing with nursing and/or other providers on the treatment team, obtaining history and examining the patient, and discussing treatment plans. Care Plan discussed with: Patient, Nursing Staff and >50% of time spent in counseling and coordination of care Discussed:  Care Plan and D/C Planning Prophylaxis:  Lovenox Disposition:  TBD Subjective: Chief Complaint:  Follow up falls Chart/notes/labs/studies reviewed, patient examined at bedside. Feels okay. No significant pain. Mentation improved. Chronic loose stools. Objective:  
 
 
Vitals:  
 
  
Last 24hrs VS reviewed since prior progress note. Most recent are: 
 
Visit Vitals /72 (BP 1 Location: Left arm, BP Patient Position: At rest) Pulse 62 Temp 99 °F (37.2 °C) Resp 16 Ht 5' 2\" (1.575 m) Wt 50.8 kg (112 lb) SpO2 98% BMI 20.49 kg/m² SpO2 Readings from Last 6 Encounters:  
12/09/18 98% 05/28/14 100% 11/01/12 100% Intake/Output Summary (Last 24 hours) at 12/9/2018 6090 Last data filed at 12/9/2018 6724 Gross per 24 hour Intake 556.25 ml Output 300 ml Net 256.25 ml Exam:  
 
Physical Exam: 
 
Gen:  Well-developed, well-nourished, in no acute distress. Thin HEENT:  Sclerae nonicteric, hearing intact to voice, mucous membranes moist 
Neck:  Supple, without masses. Resp:  No accessory muscle use, CTAB without wheezes, rales, or rhonchi 
Card: RRR, without m/r/g. No LE edema. Abd:  +bowel sounds, soft, NTTP, nondistended. No HSM. Neuro: Face symmetric, tongue midline, speech fluent, follows commands appropriately. 4+/5 strength BLES. LUE tremors. No cogwheel rigidity Psych:  Alert, oriented x 3. Good insight Medications Reviewed: (see below) Lab Data Reviewed: (see below) 
 
______________________________________________________________________ Medications:  
 
Current Facility-Administered Medications Medication Dose Route Frequency  acetaminophen (TYLENOL) tablet 650 mg  650 mg Oral Q6H PRN  propranolol (INDERAL) tablet 40 mg  40 mg Oral BID And  
 propranolol (INDERAL) tablet 20 mg  20 mg Oral BID  sodium chloride (NS) flush 5-10 mL  5-10 mL IntraVENous Q8H  
 sodium chloride (NS) flush 5-10 mL  5-10 mL IntraVENous PRN  
 0.9% sodium chloride infusion  75 mL/hr IntraVENous CONTINUOUS  
 enoxaparin (LOVENOX) injection 40 mg  40 mg SubCUTAneous Q24H  ALPRAZolam (XANAX) tablet 0.25 mg  0.25 mg Oral BID PRN  
 atorvastatin (LIPITOR) tablet 40 mg  40 mg Oral DAILY  FLUoxetine (PROzac) capsule 20 mg  20 mg Oral DAILY  fluticasone (FLONASE) 50 mcg/actuation nasal spray 2 Spray  2 Spray Both Nostrils DAILY PRN  
 levothyroxine (SYNTHROID) tablet 100 mcg  100 mcg Oral Once per day on Sun Mon Tue Thu Fri  
 [START ON 12/12/2018] levothyroxine (SYNTHROID) tablet 50 mcg  50 mcg Oral Once per day on Wed Sat  loperamide (IMODIUM) capsule 2 mg  2 mg Oral QID PRN Lab Review:  
 
Recent Labs 12/09/18 
0110 12/08/18 
1836 WBC 4.9 6.3 HGB 12.4 11.8 HCT 34.4* 33.1*  
 346 Recent Labs 12/09/18 
0110 12/08/18 
1836 * 121*  
K 3.6 3.2*  
CL 90* 85* CO2 24 26 * 123* BUN 9 11 CREA 0.67 0.76 CA 9.2 9.2 MG  --  2.2 ALB  --  4.1 SGOT  --  31 ALT  --  33 No components found for: [place] No results for input(s): PH, PCO2, PO2, HCO3, FIO2 in the last 72 hours. No results for input(s): INR in the last 72 hours. No lab exists for component: INREXT Lab Results Component Value Date/Time Specimen Description: URINE 07/29/2009 05:29 PM  
 
Lab Results Component Value Date/Time Culture result: ESCHERICHIA COLI 07/29/2009 05:29 PM  
 
        
___________________________________________________ Attending Physician: Yoli Elizabeth MD

## 2018-12-09 NOTE — CONSULTS
[address] Frederick  MR#: [id number]  : 1945  ACCOUNT #: [de-identified]   DATE OF SERVICE: 2018    REQUESTING:  Soo Sims MD     Thanks for asking us to see the patient in neurologic consultation regarding falls, worsening memory and tremor. Dr. Brian Mendez was kind enough to discuss the case with him. History is obtained from my discussion with him as well as discussion with the patient and her son-in-law at the bedside. I have also reviewed the electronic record and personally reviewed her CT scan of the head. In any regard, this is a 40-year-old lady who has a history of essential tremor and sees Dr. Amelia Ribeiro at [facility]. About a year and a half ago she had a deep brain stimulator implanted in the left hemisphere by Dr. Ronny roblero at [facility]. This markedly controlled her right-sided tremor. She has not had any recent adjustments of the stimulator. She indicates Dr. Amelia Ribeiro told her he would see her in 5 years when she needed a new generator. In any regard, the right-side tremor is controlled and she is right handed. She was brought to the hospital after being found confused, multiple medication bottles were lying about. She has also had 5 falls in the last several weeks. Family has been concerned about the falls and the worsening memory. Her last fall was about 10 days to 2 weeks ago. She came in with a sodium of 121 and she apparently has history of hyponatremia and she notes that prior to this deep brain stimulator implantation they found that her sodium was low and they told her it was because she drank too much water, but she is not giving me a history of psychogenic polydipsia. She tells me that she eats salt all the time because they tell her her blood salt is low. Both she and her family think that her short-term memory has gotten worse. She does live alone. She drives. She handles her checkbook.   Her son-in-law is not aware of any issues in that regard. No focal complaints. PAST MEDICAL HISTORY:  1. Essential tremor as stated. 2.  Childhood asthma. 3.  Dyslipidemia. 4.  Hypertension. 5.  Anxiety. 6.  Hypothyroidism. PAST SURGICAL HISTORY:  1. Cervical fusion back in the 1980s. 2.  Tonsillectomy. 3.  Appendectomy. 4.  Hysterectomy. MEDICATIONS:  As an outpatient include Synthroid, Inderal, Prozac, Flonase, vitamin D, Imodium for chronic diarrhea reports, Xanax 0.25 mg b.i.d. p.r.n., Lipitor, Boniva, Synthroid, magnesium. MEDICATIONS HERE:  Lipitor, Lovenox, Synthroid, Inderal 60 b.i.d. ALLERGIES:  LATEX. SOCIAL HISTORY:  As stated, she does not smoke. She has a glass of wine several times a week. FAMILY HISTORY:  Denies any neurologic history except for tremor. REVIEW OF SYSTEMS:  As noted above with pertinent positives and negatives. Otherwise, comprehensive systems review is negative. PHYSICAL EXAMINATION:  GENERAL:  Comfortable appearing, appropriate appearing. Wears glasses. She has her son-in-law at the bedside. VITAL SIGNS:  Afebrile, pulse 62, 128/72 is her blood pressure and oxygen saturation 98% on room air. HEENT:  Sclerae are anicteric. Oropharynx is clear and moist.  NECK:  Supple. EXTREMITIES:  Her pulses are symmetric. She is wearing a brace on her left hand noting that she wears it because she fractured her thumb on one of her falls. No edema. She is quite thin. NEUROLOGIC:  She is awake, alert, oriented to [facility], says it is December, somewhere around the ninth of tenth 2018 and she believes she is either on the fifth floor or the seventh floor. She says the current president is \"the orange one,\" and the previous President was [employer], \"my favorite\". She follows multistep commands. She is fluent. Registration 3/3 and her recall was 2/3 at 5 minutes and 3/3 with hints. Cranial nerves are intact II-XII.   She does not have nystagmus. She has no pronation and no drift. She has minimal postural tremor of the right outstretched hand. She has a moderate degree of essential tremor with the left outstretched. There is no pronation and no drift. She resists fully in the upper and lower extremities in all muscle groups to direct testing. Reflexes are quite brisk in the upper extremities, i.e., pathologically brisk and she has Jara's bilaterally. Lower extremity reflexes are symmetrical and a bit depressed actually. Toes are mute. She has no ataxia, but she does have intention on finger-nose-finger and marked on the left and minimal on the right. Sensory intact to primary. STUDIES:  CT scan of the head personally reviewed, shows no acute abnormality. Sodium was as stated. TSH is fine. IMPRESSION:  A 42-year-old lady with essential tremor and deep brain stimulator in place, left hemispheric which is controlling her right-sided tremor. Interestingly, she does not turn the battery off at night to conserve battery life. In any regard, this is not parkinsonian. There is no cogwheeling, etc.  Would maintain her current DBS settings and continue the current Inderal.    Frequent falls with an examination demonstrative of hyperreflexia as noted. Question for a myelopathic process. We are going to go ahead and get a CT scan of the cervical spine looking for anything obvious. Certainly, we can get MRI scans with the [employer] deep brain stimulator; however, the representative from [employer], Yuri, needs to interrogate the generator and make sure impedances, etc. are appropriate and then the machine, the machine being the MRI machine, needs to used in a special set up. It has been my experience it is difficult to get these here at [facility] and certainly [facility] does them and that is where we typically send people for imaging with DBS.   So what I have discussed is that we will check the cervical spine with a CT and certainly if there is something obvious then we will get spine service involved. If there is no obvious abnormality there, then we will plan for her to follow with Dr. Kervin Mccarty, her regular neurologist, and have an MRI scan of the cervical spine done at [facility]. In terms of her cognitive complaints, she did fairly well on the bedside examination as noted above. I will check a B12 level. We will have her see Dr. Sophie Malagon as an outpatient for an evaluation regarding question of dementia. We will follow up on that CT scan. In terms of the hyponatremia, she is likely being affected in all realms by that and it is better this morning certainly at 794, so we will see what improvement we get with that as well. Thanks for your request for consultation.       MD ELSY Macedo / JAMAAL  D: 12/09/2018 09:13     T: 12/09/2018 09:44  JOB #: [id number]

## 2018-12-09 NOTE — PROGRESS NOTES
TRANSFER - IN REPORT: 
 
Verbal report received from Inés(name) on Saad E George  being received from ED(unit) for routine progression of care Report consisted of patients Situation, Background, Assessment and  
Recommendations(SBAR). Information from the following report(s) SBAR, Kardex, ED Summary, MAR, Accordion and Recent Results was reviewed with the receiving nurse. Opportunity for questions and clarification was provided. Assessment completed upon patients arrival to unit and care assumed.

## 2018-12-09 NOTE — CONSULTS
Consult dictated Likely has mild SIAD and excessive water intake. Improving on NS presently. Continue fluid restriction. Likely needs to be advised to restrict fluid after d/c. Routine eval of hyponatremia

## 2018-12-09 NOTE — ED NOTES
TRANSFER - OUT REPORT: 
 
Verbal report given to Tamar(name) on Saad E George  being transferred to 529(unit) for routine progression of care Report consisted of patients Situation, Background, Assessment and  
Recommendations(SBAR). Information from the following report(s) SBAR, Kardex, ED Summary, MAR, Recent Results and Cardiac Rhythm NSR was reviewed with the receiving nurse. Lines:  
Peripheral IV 12/08/18 Right Antecubital (Active) Site Assessment Clean, dry, & intact 12/8/2018  6:45 PM  
Phlebitis Assessment 0 12/8/2018  6:45 PM  
Infiltration Assessment 0 12/8/2018  6:45 PM  
Dressing Status Clean, dry, & intact 12/8/2018  6:45 PM  
Hub Color/Line Status Pink 12/8/2018  6:45 PM  
Action Taken Blood drawn 12/8/2018  6:45 PM  
  
 
Opportunity for questions and clarification was provided. Patient transported with: 
 Registered Nurse

## 2018-12-09 NOTE — PROGRESS NOTES
[facility]: MED RECONCILIATION Comments/Recommendations:  
? Interview with patient and family member at the bedside ? Increased risk of fall 
o Alprazolam - The patient has taken this medication since 1987 for anxiety per her report. She is prescribed to take up to four pills per day. The patient reports she usually only uses one dose and rarely two. The patient was counseled that this medication increases her risk of fall. Recommend tapering this medication off. 
o Hydrocodone/apap 5/325 - The patient had an old prescription from a thumb surgery in October with 19 pills left in the bottle. When the patient fell and hurt her back a week and a half ago the patient started taking this medication for pain. She consumed 19 pills in 9 days. At some point the patient became confused and thought she was taking this medication for diarrhea. The patient was counseled that this medication increases her risk of fall. The patient was counseled that she should not combine narcotic pain medications with alprazolam as it increases her risk of death. The patient is out of her hydrocodone/apap. Her last dose is unknown. ? Diarrhea 
o The patient complains of consistent diarrhea since the early fall. She reports she has had multiple tests with no conclusive diagnosis. It is noted that the patient is taking magnesium 250 mg twice daily which she reports is for her bones. Magnesium can cause diarrhea. Consider stopping magnesium and see if it improves her diarrhea. ? Family reports the patient had multiple pill bottles at home which just stated as needed without a specific indication. Family was counseled to call the patient's preferred pharmacy and request that a note be placed on the patient's profile stating the following: PATIENT REQUIRES AN AS NEEDED INDICATION ON ALL PRESCRIPTIONS. ? Discussed with Dr. Joie Santo.  
? Addendum 10:22pm 
? Returned to the patient's room and asked if she was taking salt tabs prior to admission. The patient reported she was not taking salt tabs prior to admission. The patient took salt tablets prior to her brain surgery but she has not taken salt tabs since her brain surgery. Medications added: · Clobetasol · Fluoxetine · Fluticasone · Calcium citrate · Loperamide Medications removed: 
 
· Co-enzyme Q10 · Lomotil · Fish oil · Vitamin E Medications adjusted: · Levothyroxine changed to 100 mcg 5 days per week and 50 mcg on Wednesday and Saturday · Propranolol LA changed to propranolol 60 mg BID - verified with prescription refill history. Allergies: Latex, natural rubber and Latex Prior to Admission Medications:  
Prior to Admission Medications Prescriptions Last Dose Informant Patient Reported? Taking? ALPRAZolam (XANAX) 0.25 mg tablet 2018 at Unknown time Self Yes Yes Sig: Take 0.25 mg by mouth two (2) times daily as needed for Anxiety. FLUoxetine (PROZAC) 20 mg tablet 2018 at Unknown time Self Yes Yes Sig: Take 20 mg by mouth daily. atorvastatin (LIPITOR) 40 mg tablet 2018 at Unknown time Self Yes Yes Sig: Take 40 mg by mouth daily. calcium citrate/vitamin D3 (CALCIUM CITRATE + D PO) 2018 at am Self Yes Yes Sig: Take 2 Tabs by mouth two (2) times a day. clobetasol (TEMOVATE) 0.05 % external solution  Self Yes Yes Sig: Apply  to affected area daily as needed (apply to scalp as needed for flaking). fluticasone (FLONASE ALLERGY RELIEF) 50 mcg/actuation nasal spray  Self Yes Yes Si Sprays by Both Nostrils route daily as needed for Rhinitis. ibandronate (BONIVA) 150 mg tablet 2018 Self Yes Yes Sig: Take 150 mg by mouth every thirty (30) days. levothyroxine (SYNTHROID) 100 mcg tablet 2018 at Unknown time Self Yes Yes Sig: Take 100 mcg by mouth five (5) days a week. Patient takes 100 mcg on Monday, Tuesday, Thursday, Friday,  levothyroxine (SYNTHROID) 100 mcg tablet 12/8/2018 at Unknown time Self Yes Yes Sig: Take 50 mcg by mouth two (2) days a week. Patient takes 50 mcg on Wednesday and Saturday  
loperamide (IMMODIUM) 2 mg tablet  Self Yes Yes Sig: Take 2 mg by mouth four (4) times daily as needed for Diarrhea.  
magnesium 250 mg Tab 12/8/2018 at am Self Yes Yes Sig: Take 250 mg by mouth two (2) times a day. propranolol (INDERAL) 60 mg tablet 12/8/2018 at am Self Yes Yes Sig: Take 60 mg by mouth two (2) times a day. Facility-Administered Medications: None Thank you, Wallace Chaidez, PharmD, BCPS

## 2018-12-10 ENCOUNTER — APPOINTMENT (OUTPATIENT)
Dept: CT IMAGING | Age: 73
DRG: 640 | End: 2018-12-10
Attending: NURSE PRACTITIONER
Payer: MEDICARE

## 2018-12-10 LAB
ANION GAP SERPL CALC-SCNC: 8 MMOL/L (ref 5–15)
BUN SERPL-MCNC: 7 MG/DL (ref 6–20)
BUN/CREAT SERPL: 14 (ref 12–20)
CALCIUM SERPL-MCNC: 8.1 MG/DL (ref 8.5–10.1)
CHLORIDE SERPL-SCNC: 94 MMOL/L (ref 97–108)
CHLORIDE UR-SCNC: 24 MMOL/L
CO2 SERPL-SCNC: 25 MMOL/L (ref 21–32)
CORTIS SERPL-MCNC: 16.7 UG/DL
CREAT SERPL-MCNC: 0.5 MG/DL (ref 0.55–1.02)
ERYTHROCYTE [DISTWIDTH] IN BLOOD BY AUTOMATED COUNT: 12.3 % (ref 11.5–14.5)
GLUCOSE SERPL-MCNC: 99 MG/DL (ref 65–100)
HCT VFR BLD AUTO: 33.8 % (ref 35–47)
HGB BLD-MCNC: 11.3 G/DL (ref 11.5–16)
MAGNESIUM SERPL-MCNC: 1.9 MG/DL (ref 1.6–2.4)
MCH RBC QN AUTO: 30.5 PG (ref 26–34)
MCHC RBC AUTO-ENTMCNC: 33.4 G/DL (ref 30–36.5)
MCV RBC AUTO: 91.1 FL (ref 80–99)
NRBC # BLD: 0 K/UL (ref 0–0.01)
NRBC BLD-RTO: 0 PER 100 WBC
OSMOLALITY UR: 321 MOSM/KG H2O
PHOSPHATE SERPL-MCNC: 2.6 MG/DL (ref 2.6–4.7)
PLATELET # BLD AUTO: 382 K/UL (ref 150–400)
PMV BLD AUTO: 8.9 FL (ref 8.9–12.9)
POTASSIUM SERPL-SCNC: 3.7 MMOL/L (ref 3.5–5.1)
POTASSIUM UR-SCNC: 28 MMOL/L
RBC # BLD AUTO: 3.71 M/UL (ref 3.8–5.2)
SODIUM SERPL-SCNC: 127 MMOL/L (ref 136–145)
SODIUM UR-SCNC: 18 MMOL/L
VIT B12 SERPL-MCNC: 1486 PG/ML (ref 193–986)
WBC # BLD AUTO: 6.3 K/UL (ref 3.6–11)

## 2018-12-10 PROCEDURE — 83735 ASSAY OF MAGNESIUM: CPT

## 2018-12-10 PROCEDURE — 84100 ASSAY OF PHOSPHORUS: CPT

## 2018-12-10 PROCEDURE — 97530 THERAPEUTIC ACTIVITIES: CPT

## 2018-12-10 PROCEDURE — 74011250637 HC RX REV CODE- 250/637: Performed by: INTERNAL MEDICINE

## 2018-12-10 PROCEDURE — 36415 COLL VENOUS BLD VENIPUNCTURE: CPT

## 2018-12-10 PROCEDURE — 72128 CT CHEST SPINE W/O DYE: CPT

## 2018-12-10 PROCEDURE — 82746 ASSAY OF FOLIC ACID SERUM: CPT

## 2018-12-10 PROCEDURE — 97760 ORTHOTIC MGMT&TRAING 1ST ENC: CPT

## 2018-12-10 PROCEDURE — 97165 OT EVAL LOW COMPLEX 30 MIN: CPT

## 2018-12-10 PROCEDURE — 65270000029 HC RM PRIVATE

## 2018-12-10 PROCEDURE — 82607 VITAMIN B-12: CPT

## 2018-12-10 PROCEDURE — 82533 TOTAL CORTISOL: CPT

## 2018-12-10 PROCEDURE — 97162 PT EVAL MOD COMPLEX 30 MIN: CPT

## 2018-12-10 PROCEDURE — 97116 GAIT TRAINING THERAPY: CPT

## 2018-12-10 PROCEDURE — 74011250636 HC RX REV CODE- 250/636: Performed by: INTERNAL MEDICINE

## 2018-12-10 PROCEDURE — 85027 COMPLETE CBC AUTOMATED: CPT

## 2018-12-10 PROCEDURE — 80048 BASIC METABOLIC PNL TOTAL CA: CPT

## 2018-12-10 PROCEDURE — 97535 SELF CARE MNGMENT TRAINING: CPT

## 2018-12-10 RX ORDER — ASPIRIN 325 MG/1
100 TABLET, FILM COATED ORAL DAILY
Status: DISCONTINUED | OUTPATIENT
Start: 2018-12-11 | End: 2018-12-13 | Stop reason: HOSPADM

## 2018-12-10 RX ORDER — POTASSIUM CHLORIDE 750 MG/1
20 TABLET, FILM COATED, EXTENDED RELEASE ORAL
Status: COMPLETED | OUTPATIENT
Start: 2018-12-10 | End: 2018-12-10

## 2018-12-10 RX ORDER — LORAZEPAM 2 MG/ML
2 INJECTION INTRAMUSCULAR
Status: DISCONTINUED | OUTPATIENT
Start: 2018-12-10 | End: 2018-12-11

## 2018-12-10 RX ORDER — FOLIC ACID 1 MG/1
1 TABLET ORAL DAILY
Status: DISCONTINUED | OUTPATIENT
Start: 2018-12-11 | End: 2018-12-13 | Stop reason: HOSPADM

## 2018-12-10 RX ORDER — LORAZEPAM 2 MG/ML
4 INJECTION INTRAMUSCULAR
Status: DISCONTINUED | OUTPATIENT
Start: 2018-12-10 | End: 2018-12-11

## 2018-12-10 RX ORDER — THERA TABS 400 MCG
1 TAB ORAL DAILY
Status: DISCONTINUED | OUTPATIENT
Start: 2018-12-11 | End: 2018-12-13 | Stop reason: HOSPADM

## 2018-12-10 RX ADMIN — POTASSIUM CHLORIDE 20 MEQ: 750 TABLET, EXTENDED RELEASE ORAL at 09:07

## 2018-12-10 RX ADMIN — ACETAMINOPHEN 650 MG: 325 TABLET, FILM COATED ORAL at 04:17

## 2018-12-10 RX ADMIN — PROPRANOLOL HYDROCHLORIDE 40 MG: 10 TABLET ORAL at 09:07

## 2018-12-10 RX ADMIN — Medication 10 ML: at 22:39

## 2018-12-10 RX ADMIN — ENOXAPARIN SODIUM 40 MG: 40 INJECTION SUBCUTANEOUS at 22:26

## 2018-12-10 RX ADMIN — PROPRANOLOL HYDROCHLORIDE 20 MG: 10 TABLET ORAL at 09:07

## 2018-12-10 RX ADMIN — LOPERAMIDE HYDROCHLORIDE 2 MG: 2 CAPSULE ORAL at 10:53

## 2018-12-10 RX ADMIN — PROPRANOLOL HYDROCHLORIDE 20 MG: 10 TABLET ORAL at 22:26

## 2018-12-10 RX ADMIN — PROPRANOLOL HYDROCHLORIDE 40 MG: 10 TABLET ORAL at 22:25

## 2018-12-10 RX ADMIN — ALPRAZOLAM 0.25 MG: 0.25 TABLET ORAL at 02:36

## 2018-12-10 RX ADMIN — LOPERAMIDE HYDROCHLORIDE 2 MG: 2 CAPSULE ORAL at 19:05

## 2018-12-10 RX ADMIN — TRAMADOL HYDROCHLORIDE 50 MG: 50 TABLET, FILM COATED ORAL at 01:51

## 2018-12-10 RX ADMIN — LEVOTHYROXINE SODIUM 100 MCG: 100 TABLET ORAL at 09:14

## 2018-12-10 RX ADMIN — Medication 10 ML: at 06:24

## 2018-12-10 RX ADMIN — ATORVASTATIN CALCIUM 40 MG: 10 TABLET, FILM COATED ORAL at 09:07

## 2018-12-10 RX ADMIN — ACETAMINOPHEN 650 MG: 325 TABLET, FILM COATED ORAL at 17:01

## 2018-12-10 RX ADMIN — Medication 10 ML: at 00:00

## 2018-12-10 RX ADMIN — ALPRAZOLAM 0.25 MG: 0.25 TABLET ORAL at 22:26

## 2018-12-10 NOTE — PROGRESS NOTES
Problem: Self Care Deficits Care Plan (Adult) Goal: *Acute Goals and Plan of Care (Insert Text) Occupational Therapy Goals Initiated 12/10/2018 1. Patient will perform lower body dressing with supervision/set-up using AE PRN within 7 days. 2.  Patient will perform toilet transfers with supervision/set-up using most appropriate DME within 7 days. 3.  Patient will perform grooming, standing at sink, at supervision/set-up within 7 days. 4.  Patient will don/doff back brace at supervision/set-up within 7 days. 5.  Patient will verbalize/demonstrate 3/3 back precautions during ADL tasks without cues within 7 days. Occupational Therapy EVALUATION Patient: Magdalene Dc (59 y.o. female) Date: 12/10/2018 Primary Diagnosis: Hyponatremia Hyponatremia Hyponatremia Precautions:   Back ASSESSMENT : 
Based on the objective data described below, the patient presents with hospital admission secondary to hyponatremia and T10 compression fracture secondary to multiple falls. Patient received on [facility] with nursing tech and agreeable to activity. Patient noted with increased anxiety regarding next steps as well as some confusion with conversation though able to answer orientation questions. Patient reports living alone and independent with all activity. Patient requires min assist for thoroughness of hygiene while seated on commode. Multiple verbal cues to stand from commode to don brief as patient reports diarrhea but with minimal stool with wiping. Patient educated on back precautions and wear schedule of TLSO. Patient with increased anxiety secondary to how she will be able to perform activity once returned home. Patient requires mod assist to don brace and min assist for transfers. Patient noted with posterior/R side lean. Patient noted with decreased functional mobility, decreased safety, impaired decision making and confusion at this time.   Unclear if current cognition is baseline however at his time she requires supervision for safety and may benefit from continued OT services in rehab setting. Patient will benefit from skilled intervention to address the above impairments. Patients rehabilitation potential is considered to be Fair Factors which may influence rehabilitation potential include:  
[]             None noted [x]             Mental ability/status []             Medical condition []             Home/family situation and support systems [x]             Safety awareness []             Pain tolerance/management 
[]             Other: PLAN : 
Recommendations and Planned Interventions: 
[x]               Self Care Training                  [x]        Therapeutic Activities [x]               Functional Mobility Training    []        Cognitive Retraining 
[x]               Therapeutic Exercises           [x]        Endurance Activities [x]               Balance Training                   []        Neuromuscular Re-Education []               Visual/Perceptual Training     [x]   Home Safety Training 
[x]               Patient Education                 [x]        Family Training/Education []               Other (comment): Frequency/Duration: Patient will be followed by occupational therapy 5 times a week to address goals. Discharge Recommendations: Rehab vs HH with supervision Further Equipment Recommendations for Discharge: TBD SUBJECTIVE:  
Patient stated Its those type of decisions that get me.  Therapist questioned if patient would like shades open in room OBJECTIVE DATA SUMMARY:  
HISTORY:  
Past Medical History:  
Diagnosis Date  Asthma   
 as a child  Hypercholesterolemia  Hypertension  Ill-defined condition \"essential tremors with implant in chest on Left side under collar bone connected to the brain\"  Nausea & vomiting  Other ill-defined conditions(578.58)   
  high cholesterol, osteopenia  Psychiatric disorder   
 anxiety  Thyroid disease   
 hypothryroidism  Tremor Past Surgical History:  
Procedure Laterality Date  ABDOMEN SURGERY PROC UNLISTED  1981  
 partial abdominal Hysterectomy, bladder tacking,  
 HX APPENDECTOMY  HX BUNIONECTOMY  HX ORTHOPAEDIC  1986  
 neck fusion,  
 HX ORTHOPAEDIC Right Bunionectomy  HX PACEMAKER Deep brain stim Implanted device to L chest that Dr. Norberto Sommers at [facility] put in for essential tremors  HX TONSILLECTOMY Prior Level of Function/Environment/Context:  
Occupations in which the patient is/was successful, what are the barriers preventing that success:  
Performance Patterns (routines, roles, habits, and rituals):  
Personal Interests and/or values:  
Expanded or extensive additional review of patient history:  
 
Home Situation Home Environment: Private residence # Steps to Enter: 2 One/Two Story Residence: One story Living Alone: Yes Support Systems: Child(jessica), Family member(s) Patient Expects to be Discharged to[de-identified] Private residence Current DME Used/Available at Home: Walker, rolling Tub or Shower Type: Tub/Shower combination Hand dominance: RightEXAMINATION OF PERFORMANCE DEFICITS: 
Cognitive/Behavioral Status: 
Neurologic State: Alert Orientation Level: Oriented X4(but word finding and processing delay) Cognition: Memory loss Perception: Appears intact Safety/Judgement: Decreased insight into deficits; Decreased awareness of need for safety;Decreased awareness of need for assistance Skin: intact as seen Edema: none noted Hearing: Auditory Auditory Impairment: None Vision/Perceptual:   
    
    
    
  
    
    
Corrective Lenses: Glasses Range of Motion: 
AROM: Within functional limits(Except trunk AROM NT) Strength: 
 
Strength: Generally decreased, functional 
  
  
  
  
Coordination: 
Coordination: Generally decreased, functional 
    
  
 Tone & Sensation: 
 
Tone: Normal 
Sensation: Intact Balance: 
Sitting: Intact; Without support Standing: Impaired Standing - Static: Constant support;Fair(posterior lean ) Standing - Dynamic : Fair Functional Mobility and Transfers for ADLs:Bed Mobility: 
Rolling: (received up on [facility], returned to chair) Transfers: 
Sit to Stand: Minimum assistance Stand to Sit: Contact guard assistance Bed to Chair: Minimum assistance Toilet Transfer : Minimum assistance ADL Assessment: 
Feeding: Supervision Oral Facial Hygiene/Grooming: Minimum assistance Bathing: Minimum assistance Upper Body Dressing: Contact guard assistance Lower Body Dressing: Minimum assistance Toileting: Minimum assistance(thoroughness) ADL Intervention and task modifications: 
  
 
  
 
  
 
  
 
  
 
  
 
  
 
Cognitive Retraining Safety/Judgement: Decreased insight into deficits; Decreased awareness of need for safety;Decreased awareness of need for assistance Therapeutic Exercise: 
  
Functional Measure: 
Barthel Index: 
 
Bathin Bladder: 10 Bowels: 5 Groomin Dressin Feeding: 10 Mobility: 10 Stairs: 5 Toilet Use: 5 Transfer (Bed to Chair and Back): 10 Total: 60 Barthel and G-code impairment scale: 
Percentage of impairment CH 
0% CI 
1-19% CJ 
20-39% CK 
40-59% CL 
60-79% CM 
80-99% CN 
100% Barthel Score 0-100 100 99-80 79-60 59-40 20-39 1-19 
 0 Barthel Score 0-20 20 17-19 13-16 9-12 5-8 1-4 0 The Barthel ADL Index: Guidelines 1. The index should be used as a record of what a patient does, not as a record of what a patient could do. 2. The main aim is to establish degree of independence from any help, physical or verbal, however minor and for whatever reason. 3. The need for supervision renders the patient not independent.  
4. A patient's performance should be established using the best available evidence. Asking the patient, friends/relatives and nurses are the usual sources, but direct observation and common sense are also important. However direct testing is not needed. 5. Usually the patient's performance over the preceding 24-48 hours is important, but occasionally longer periods will be relevant. 6. Middle categories imply that the patient supplies over 50 per cent of the effort. 7. Use of aids to be independent is allowed. Arya Bender., Barthel, BECKY. (6797). Functional evaluation: the Barthel Index. [address] [facility] (14)2. Deborah Winn diandra BEVERLY Wilcox, Nixon Mcnamara., Anyi Hanson., [place], [address] (1999). Measuring the change indisability after inpatient rehabilitation; comparison of the responsiveness of the Barthel Index and Functional [place] Measure. Journal of Neurology, Neurosurgery, and Psychiatry, 66(4), 862-344. ALFONZO Nj, PATRICIA Lopez, & Antonietta Marrufo M.A. (2004.) Assessment of post-stroke quality of life in cost-effectiveness studies: The usefulness of the Barthel Index and the EuroQoL-5D. [facility], 13, 297-96 G codes: In compliance with CMSs Claims Based Outcome Reporting, the following G-code set was chosen for this patient based on their primary functional limitation being treated: The outcome measure chosen to determine the severity of the functional limitation was the Barthel Index  with a score of 60/100 which was correlated with the impairment scale. ? Self Care:  
  - CURRENT STATUS: CJ - 20%-39% impaired, limited or restricted  - GOAL STATUS: CI - 1%-19% impaired, limited or restricted  - D/C STATUS:  ---------------To be determined--------------- Occupational Therapy Evaluation Charge Determination History Examination Decision-Making LOW Complexity : Brief history review  LOW Complexity : 1-3 performance deficits relating to physical, cognitive , or psychosocial skils that result in activity limitations and / or participation restrictions  LOW Complexity : No comorbidities that affect functional and no verbal or physical assistance needed to complete eval tasks Based on the above components, the patient evaluation is determined to be of the following complexity level: LOW Pain: 
Pain Scale 1: Numeric (0 - 10) Pain Intensity 1: 3 Activity Tolerance: VSS Please refer to the flowsheet for vital signs taken during this treatment. After treatment:  
[x] Patient left in no apparent distress sitting up in chair 
[] Patient left in no apparent distress in bed 
[x] Call bell left within reach [x] Nursing notified 
[x] Caregiver present [x] Bed alarm activated COMMUNICATION/EDUCATION:  
The patients plan of care was discussed with: Physical Therapist and Registered Nurse. [x] Home safety education was provided and the patient/caregiver indicated understanding. [x] Patient/family have participated as able in goal setting and plan of care. [x] Patient/family agree to work toward stated goals and plan of care. [] Patient understands intent and goals of therapy, but is neutral about his/her participation. [] Patient is unable to participate in goal setting and plan of care. This patients plan of care is appropriate for delegation to [facility]. Thank you for this referral. 
Shasta Mojica, OTR/l Time Calculation: 37 mins

## 2018-12-10 NOTE — CONSULTS
Orthopedic History and Physical     Patient: Demetris Henson MRN: [id number]  SSN: [id number]    YOB: 1945  Age: 68 y.o. Sex: female          Subjective:     Patient is a 68 y.o.  female who complains of mid thoracic back pain. Pt was admitted with hyponatremia, memory loss, frequent falls and tremors. Pt with hx of hypothyroidism, unsteady gait, hallux valgus. Over the past 2 months, patient has fallen 5 times. Pt states her back started to hurt shortly after one of her falls. CXR obtained and showed T10 wedge deformity. Pt denies any radicular pain in her upper and lower extremities. She has pain when moving around in the bed. Pt is being seen by neurology whom ordered cervical CT scan due to concerns of being myelopathic. Pt with deep brain stimulator for tremors, patient unable to have MRI at this facility. Pt reports being off balance for several weeks and unable to get in and out of bed.      Patient Active Problem List    Diagnosis Date Noted    Hyponatremia 12/08/2018    Fall 12/08/2018    Hyperlipidemia 12/08/2018    Acquired hypothyroidism 12/08/2018    Unsteady gait 12/08/2018    Hallux valgus (acquired) 10/18/2012     Past Medical History:   Diagnosis Date    Asthma     as a child    Hypercholesterolemia     Hypertension     Ill-defined condition     \"essential tremors with implant in chest on Left side under collar bone connected to the brain\"    Nausea & vomiting     Other ill-defined conditions(799.89)      high cholesterol, osteopenia    Psychiatric disorder     anxiety    Thyroid disease     hypothryroidism    Tremor       Past Surgical History:   Procedure Laterality Date    ABDOMEN SURGERY PROC UNLISTED  1981    partial abdominal Hysterectomy, bladder tacking,    HX APPENDECTOMY      HX BUNIONECTOMY      HX ORTHOPAEDIC  1986    neck fusion,    HX ORTHOPAEDIC Right     Bunionectomy    HX PACEMAKER      Deep brain stim Implanted device to L chest that Dr. Ben Herring at [facility] put in for essential tremors    HX TONSILLECTOMY        Prior to Admission medications    Medication Sig Start Date End Date Taking? Authorizing Provider   levothyroxine (SYNTHROID) 100 mcg tablet Take 50 mcg by mouth two (2) days a week. Patient takes 50 mcg on Wednesday and Saturday   Yes Provider, Historical   propranolol (INDERAL) 60 mg tablet Take 60 mg by mouth two (2) times a day. Yes Provider, Historical   clobetasol (TEMOVATE) 0.05 % external solution Apply  to affected area daily as needed (apply to scalp as needed for flaking). Yes Provider, Historical   FLUoxetine (PROZAC) 20 mg tablet Take 20 mg by mouth daily. Yes Provider, Historical   fluticasone (FLONASE ALLERGY RELIEF) 50 mcg/actuation nasal spray 2 Sprays by Both Nostrils route daily as needed for Rhinitis. Yes Provider, Historical   calcium citrate/vitamin D3 (CALCIUM CITRATE + D PO) Take 2 Tabs by mouth two (2) times a day. Yes Provider, Historical   loperamide (IMMODIUM) 2 mg tablet Take 2 mg by mouth four (4) times daily as needed for Diarrhea. Yes Provider, Historical   ALPRAZolam (XANAX) 0.25 mg tablet Take 0.25 mg by mouth two (2) times daily as needed for Anxiety. Yes Provider, Historical   atorvastatin (LIPITOR) 40 mg tablet Take 40 mg by mouth daily. Yes Provider, Historical   ibandronate (BONIVA) 150 mg tablet Take 150 mg by mouth every thirty (30) days. Yes Provider, Historical   levothyroxine (SYNTHROID) 100 mcg tablet Take 100 mcg by mouth five (5) days a week. Patient takes 100 mcg on Monday, Tuesday, Thursday, Friday, Sunday   Yes Provider, Historical   magnesium 250 mg Tab Take 250 mg by mouth two (2) times a day.      Yes Provider, Historical     Current Facility-Administered Medications   Medication Dose Route Frequency    acetaminophen (TYLENOL) tablet 650 mg  650 mg Oral Q6H PRN    propranolol (INDERAL) tablet 40 mg  40 mg Oral BID    And    propranolol (INDERAL) tablet 20 mg  20 mg Oral BID    0.45% sodium chloride infusion  50 mL/hr IntraVENous CONTINUOUS    traMADol (ULTRAM) tablet 50 mg  50 mg Oral Q6H PRN    sodium chloride (NS) flush 5-10 mL  5-10 mL IntraVENous Q8H    sodium chloride (NS) flush 5-10 mL  5-10 mL IntraVENous PRN    enoxaparin (LOVENOX) injection 40 mg  40 mg SubCUTAneous Q24H    ALPRAZolam (XANAX) tablet 0.25 mg  0.25 mg Oral BID PRN    atorvastatin (LIPITOR) tablet 40 mg  40 mg Oral DAILY    fluticasone (FLONASE) 50 mcg/actuation nasal spray 2 Spray  2 Spray Both Nostrils DAILY PRN    levothyroxine (SYNTHROID) tablet 100 mcg  100 mcg Oral Once per day on Sun Mon Tue Thu Fri    [START ON 2018] levothyroxine (SYNTHROID) tablet 50 mcg  50 mcg Oral Once per day on Wed Sat    loperamide (IMODIUM) capsule 2 mg  2 mg Oral QID PRN      Allergies   Allergen Reactions    Latex, Natural Rubber Itching    Latex Other (comments)     itching      Social History     Tobacco Use    Smoking status: Never Smoker    Smokeless tobacco: Never Used   Substance Use Topics    Alcohol use: Yes     Alcohol/week: 2.5 oz     Types: 5 Glasses of wine per week     Comment: 5-6 wine/week      No family history on file. Review of Systems  A comprehensive review of systems was negative except for that written in the HPI. Objective:     No data found. Temp (24hrs), Av.2 °F (36.8 °C), Min:97.4 °F (36.3 °C), Max:99 °F (37.2 °C)      EXAM:   Physical Exam:   Patient appears generally well, mood and affect are appropriate and pleasant. Extremities: Max assist to get patient to her right side. Pt barely able to tolerate laying on her side due to back pain. Pt has point tenderness along her thoracic spine at level T10. No paraspinal musculature swelling. No step off. Bilateral upper and lower extremities with 5/5 strength throughout. Little effort. Full sensation throughout. Negative hoffmans, no clonus. Hyperactive reflexes.    Vascular: 2+ dorsalis pedis pulses bilaterally. Imaging Review    Imaging Review:  CXR shows T10 wedging    Labs:   Recent Results (from the past 12 hour(s))   METABOLIC PANEL, BASIC    Collection Time: 12/09/18 10:50 AM   Result Value Ref Range    Sodium 127 (L) 136 - 145 mmol/L    Potassium 3.8 3.5 - 5.1 mmol/L    Chloride 93 (L) 97 - 108 mmol/L    CO2 26 21 - 32 mmol/L    Anion gap 8 5 - 15 mmol/L    Glucose 134 (H) 65 - 100 mg/dL    BUN 10 6 - 20 MG/DL    Creatinine 0.77 0.55 - 1.02 MG/DL    BUN/Creatinine ratio 13 12 - 20      GFR est AA >60 >60 ml/min/1.73m2    GFR est non-AA >60 >60 ml/min/1.73m2    Calcium 8.1 (L) 8.5 - 10.1 MG/DL       Assessment:     Patient Active Problem List    Diagnosis Date Noted    Hyponatremia 12/08/2018    Fall 12/08/2018    Hyperlipidemia 12/08/2018    Acquired hypothyroidism 12/08/2018    Unsteady gait 12/08/2018    Hallux valgus (acquired) 10/18/2012         Plan:   68year old female patient with frequent falls, T10 compression fx noted on xray. Had a long discussion with the patient about nature of condition and treatment options. Will obtain CT of thoracic spine to check acuity of fracture and extent of fracture. Consult PT for TLSO. Pain control. Consult IR for possible kyphoplasty if amenable. Discussed with son in law about plan. Discussed case with Dr. Salo Drummond and he agrees with above plan.      Taylor Dove NP  Orthopaedic Surgery Nurse Practitioner

## 2018-12-10 NOTE — PROGRESS NOTES
David Wilkins raúl Wilder [address] 
[address] 
[phone] Medical Progress Note NAME: Saad Parsons :  1945 MRM:  [id number] Date/Time: 12/10/2018 Subjective: Chief Complaint: \"I don't know what I can't do\" Pt seen and examined. Clearly confused this AM. Repeating herself multiple times. Unwilling to quantify her alcohol consumption. Does c/o back pain. Worried about dispo to home Objective:  
 
 
Vitals:  
 
  
Last 24hrs VS reviewed since prior progress note. Most recent are: 
 
Visit Vitals /84 (BP 1 Location: Left arm) Pulse 68 Temp 98.3 °F (36.8 °C) Resp 20 Ht 5' 2\" (1.575 m) Wt 50.8 kg (112 lb) SpO2 98% BMI 20.49 kg/m² SpO2 Readings from Last 6 Encounters:  
12/10/18 98% 14 100% 12 100% Intake/Output Summary (Last 24 hours) at 12/10/2018 1247 Last data filed at 12/10/2018 7007 Gross per 24 hour Intake 2672.5 ml Output 900 ml Net 1772.5 ml Exam:  
 
Physical Exam: 
 
Gen:  Well-developed, well-nourished, in no acute distress. Thin HEENT:  Sclerae nonicteric, hearing intact to voice, mucous membranes moist 
Neck:  Supple, without masses. Resp:  No accessory muscle use, CTAB without wheezes, rales, or rhonchi 
Card: RRR, without m/r/g. No LE edema. Abd:  +bowel sounds, soft, NTTP, nondistended. No HSM. Neuro: Face symmetric, tongue midline, speech fluent, follows commands appropriately. 4+/5 strength BLES. LUE tremors. No cogwheel rigidity Psych:  Poor insight. Confused Medications Reviewed: (see below) Lab Data Reviewed: (see below) 
 
______________________________________________________________________ Medications:  
 
Current Facility-Administered Medications Medication Dose Route Frequency  LORazepam (ATIVAN) injection 2 mg  2 mg IntraVENous Q1H PRN  
 LORazepam (ATIVAN) injection 4 mg  4 mg IntraVENous Q1H PRN  
  acetaminophen (TYLENOL) tablet 650 mg  650 mg Oral Q6H PRN  propranolol (INDERAL) tablet 40 mg  40 mg Oral BID And  
 propranolol (INDERAL) tablet 20 mg  20 mg Oral BID  traMADol (ULTRAM) tablet 50 mg  50 mg Oral Q6H PRN  
 sodium chloride (NS) flush 5-10 mL  5-10 mL IntraVENous Q8H  
 sodium chloride (NS) flush 5-10 mL  5-10 mL IntraVENous PRN  
 enoxaparin (LOVENOX) injection 40 mg  40 mg SubCUTAneous Q24H  ALPRAZolam (XANAX) tablet 0.25 mg  0.25 mg Oral BID PRN  
 atorvastatin (LIPITOR) tablet 40 mg  40 mg Oral DAILY  fluticasone (FLONASE) 50 mcg/actuation nasal spray 2 Spray  2 Spray Both Nostrils DAILY PRN  
 levothyroxine (SYNTHROID) tablet 100 mcg  100 mcg Oral Once per day on Sun Mon Tue Thu Fri  
 [START ON 12/12/2018] levothyroxine (SYNTHROID) tablet 50 mcg  50 mcg Oral Once per day on Wed Sat  loperamide (IMODIUM) capsule 2 mg  2 mg Oral QID PRN Lab Review:  
 
Recent Labs 12/10/18 
0249 12/09/18 
0110 12/08/18 
1836 WBC 6.3 4.9 6.3 HGB 11.3* 12.4 11.8 HCT 33.8* 34.4* 33.1*  
 372 346 Recent Labs 12/10/18 
0343 12/09/18 
1050 12/09/18 
0110 12/08/18 
1836 * 127* 125* 121*  
K 3.7 3.8 3.6 3.2*  
CL 94* 93* 90* 85* CO2 25 26 24 26 GLU 99 134* 141* 123* BUN 7 10 9 11 CREA 0.50* 0.77 0.67 0.76 CA 8.1* 8.1* 9.2 9.2 MG 1.9  --   --  2.2 PHOS 2.6  --   --   --   
ALB  --   --   --  4.1 SGOT  --   --   --  31 ALT  --   --   --  33 No components found for: [place] No results for input(s): PH, PCO2, PO2, HCO3, FIO2 in the last 72 hours. No results for input(s): INR in the last 72 hours. No lab exists for component: INREXT, INREXT Lab Results Component Value Date/Time Specimen Description: URINE 07/29/2009 05:29 PM  
 
Lab Results Component Value Date/Time Culture result: ESCHERICHIA COLI 07/29/2009 05:29 PM  
 
   
  
Assessment / Plan: Metabolic encephalopathy in the setting of hyponatremia with confusion and falls requiring IVF, lab monitoring, CT head, PT/OT, fall precautions and Neurology consult 
-suspect likely multifactorial. Possibly 2/2 alcohol use/abuse, hyponatremia and meds  
-neurology on board; appreciate assistance  
-head CT without acute process 
-CT C and T spine without acute neurologic findings other than T10 and T11 fractures (see below) Hyponatremia: acute on chronic. 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 
-nephrology on board  
-continue to fluid restrict  
-hold SSRI  
-encourage alcohol cessation Unsteady gait/ Fall ()/ BL leg weakness: frequent falls. Head CT showed no acute process. 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 No diagnosis of Parkinson's 
-PT/OT eval  
-may need IPR  
-suspect alcohol use may be contributing to gait instability  
-neurology on board; appreciate assistance  
  
Confusion/short term memory loss - suspect 2/2 dementia, meds, alcohol use  
-will need neuropsych eval as an outpt  
-check thiamine, folic acid, O33 
  
Hyperlipidemia:  
-statin 
  
Acquired hypothyroidism (): TSH WNR 
-continue LT4 
  
Raghavendra tremors:  
-continue propranolol, deep brain stimulator implanted as above 
  
Anxiety/ depression: Xanax PRN would NOT be a good long-term choice for her, and her SSRI is held due to possible SIADH 
-psych eval  
  
T10/T11 fractures  
-TLSO brace -PT/OT eval 
-will likely need placement  
  
Chronic diarrhea: reportedly had full workup with her PCP and according to pt and daughter. Would follow up outpatient. On imodium.  
  
Alcohol abuse - suspect severe  
-WA protocol  
-thiamine, folic acid, MVI Total time spent: 35 minutes Care Plan discussed with: Patient, Nursing Staff and >50% of time spent in counseling and coordination of care Discussed:  Care Plan and D/C Planning Prophylaxis:  Lovenox Disposition:  TBD 
 ___________________________________________________ Attending Physician: Quirino Martin MD

## 2018-12-10 NOTE — PROGRESS NOTES
Problem: Mobility Impaired (Adult and Pediatric) Goal: *Acute Goals and Plan of Care (Insert Text) Physical Therapy Goals Initiated 12/10/2018 1. Patient will move from supine to sit and sit to supine , scoot up and down and roll side to side in bed with supervision/set-up within 4 days. 2. Patient will perform sit to stand with supervision/set-up within 4 days. 3. Patient will ambulate with supervision/set-up for 200 feet with the least restrictive device within 4 days. 4. Patient will ascend/descend 2 stairs with 1 handrail(s) with minimal assistance/contact guard assist within 4 days. 5. Patient will verbalize and demonstrate understanding of spinal precautions (No bending, lifting greater than 5 lbs, or twisting; log-roll technique; frequent repositioning as instructed) within 4 days. physical Therapy EVALUATION Patient: Armando Mancia (89 y.o. female) Date: 12/10/2018 Primary Diagnosis: Hyponatremia Hyponatremia Hyponatremia Precautions:   Back ASSESSMENT : 
Based on the objective data described below, the patient presents with significant limitations with functional mobility independence, safety and tolerance relative to baseline due to impaired cognition/memory, anxiety,  back pain, spinal precautions, balance and gait dysfunction and limited activity tolerance s/p admission for hyponatremia with T10 compression fx s/p multiple recent falls. Patient received with PCT on BSC, agreeable to PT session. Patient very anxious throughout session and demonstrates significant difficulty with short term memory, repeating herself and asking the same questions repeatedly. She lives alone on the first level of her two story home, 2 [address]NA. Reports being indep with mobility PTA but medical record indicates multiple falls in the last month. When asked about fall hx patient is very evasive.   Today, patient instructed in back precautions but unable to recall by the end of session, will need heavy reinforcement. Patient fitted with TLSO brace and again required near constant reassurance about the brace's fit, wear schedule, etc.  MIN A for transfer to standing and continued MIN A to maintain safe standing 2/2 posterior lean, difficulty correcting with verbal cues. Completed 40' gait with HHA with significant trunk sway and path deviations. Did RW trial next with improved step length and charbel and resolution of posterior lean, CGA. Set up in bedside chair with all needs in reach and chair alarm. Based upon's patient high fall risk (Tinetti 8/28) and cognitive/memory deficits, would not recommend d/c home without 24/7 assistance. Patient would benefit from rehab at d/c to work to maximize functional moblity independence to reduce amount of caregiver assistance but may always require 24/7 assist if current level of cognition is her baseline. Patient will benefit from skilled intervention to address the above impairments. Patients rehabilitation potential is considered to be Good Factors which may influence rehabilitation potential include:  
[]         None noted 
[x]         Mental ability/status [x]         Medical condition 
[x]         Home/family situation and support systems 
[]         Safety awareness 
[]         Pain tolerance/management 
[]         Other: PLAN : 
Recommendations and Planned Interventions: 
[x]           Bed Mobility Training             [x]    Neuromuscular Re-Education 
[x]           Transfer Training                   []    Orthotic/Prosthetic Training 
[x]           Gait Training                         []    Modalities [x]           Therapeutic Exercises           []    Edema Management/Control 
[x]           Therapeutic Activities            [x]    Patient and Family Training/Education 
[]           Other (comment): Frequency/Duration: Patient will be followed by physical therapy  6 times a week to address goals. Discharge Recommendations: Rehab vs HHPT with 24/7 assistance Further Equipment Recommendations for Discharge: TBD, possible RW SUBJECTIVE:  
Patient stated I just think I'm going to need a caregiver, am I going to live her permanently? Tarri Miners OBJECTIVE DATA SUMMARY:  
HISTORY:   
Past Medical History:  
Diagnosis Date  Asthma   
 as a child  Hypercholesterolemia  Hypertension  Ill-defined condition \"essential tremors with implant in chest on Left side under collar bone connected to the brain\"  Nausea & vomiting  Other ill-defined conditions(569.89)   
  high cholesterol, osteopenia  Psychiatric disorder   
 anxiety  Thyroid disease   
 hypothryroidism  Tremor Past Surgical History:  
Procedure Laterality Date  ABDOMEN SURGERY PROC UNLISTED  1981  
 partial abdominal Hysterectomy, bladder tacking,  
 HX APPENDECTOMY  HX BUNIONECTOMY  HX ORTHOPAEDIC  1986  
 neck fusion,  
 HX ORTHOPAEDIC Right Bunionectomy  HX PACEMAKER Deep brain stim Implanted device to L chest that Dr. Madhu Lincoln at [facility] put in for essential tremors  HX TONSILLECTOMY Prior Level of Function/Home Situation: lives alone and was indep PTA but many recent falls Personal factors and/or comorbidities impacting plan of care: memory deficits Home Situation Home Environment: Private residence # Steps to Enter: 2 One/Two Story Residence: One story Living Alone: Yes Support Systems: Child(jessica), Family member(s) Patient Expects to be Discharged to[de-identified] Private residence Current DME Used/Available at Home: Walker, rolling Tub or Shower Type: Tub/Shower combination EXAMINATION/PRESENTATION/DECISION MAKING: Critical Behavior: 
Neurologic State: Alert Orientation Level: Oriented X4(but word finding and processing delay) Cognition: Memory loss Hearing: Auditory Auditory Impairment: NoneSkin:  See nursing notes Edema: none Range Of Motion: AROM: Within functional limits(Except trunk AROM NT) Strength:   
Strength: Generally decreased, functional 
  
  
  
  
  
  
Tone & Sensation:  
Tone: Normal 
  
  
  
  
Sensation: Intact Coordination: 
Coordination: Generally decreased, functional 
Vision:  
  
Functional Mobility: 
Bed Mobility: 
Rolling: (received up on BSC, returned to chair) Transfers: 
Sit to Stand: Minimum assistance Stand to Sit: Contact guard assistance Balance:  
Sitting: Intact; Without support Standing: Impaired Standing - Static: Constant support;Fair(posterior lean ) Standing - Dynamic : FairAmbulation/Gait Training:Distance (ft): 80 Feet (ft)(after 36' with HHA) Assistive Device: Gait belt;Walker, rolling Ambulation - Level of Assistance: Minimal assistance;Assist x1;Contact guard assistance(MIN A HHA; CGA with RW) Gait Abnormalities: Decreased step clearance; Path deviations; Shuffling gait;Trunk sway increased Base of Support: Narrowed Speed/Hellen: Slow;Shuffled Step Length: Left shortened;Right shortened Functional Measure: 
Tinetti test: 
 
Sitting Balance: 1 Arises: 0 Attempts to Rise: 2 Immediate Standing Balance: 0 Standing Balance: 0 Nudged: 0 Eyes Closed: 0 Turn 360 Degrees - Continuous/Discontinuous: 0 Turn 360 Degrees - Steady/Unsteady: 0 Sitting Down: 1 Balance Score: 4 Indication of Gait: 0 
R Step Length/Height: 0 
L Step Length/Height: 0 
R Foot Clearance: 1 L Foot Clearance: 1 Step Symmetry: 1 Step Continuity: 0 Path: 1 Trunk: 0 Walking Time: 0 Gait Score: 4 Total Score: 8 Tinetti Test and G-code impairment scale: 
Percentage of Impairment CH 
 
0% 
 CI 
 
1-19% CJ 
 
20-39% CK 
 
40-59% CL 
 
60-79% CM 
 
80-99% CN  
 
100% Tinetti Score 0-28 28 23-27 17-22 12-16 6-11 1-5 0 Tinetti Tool Score Risk of Falls 
<19 = High Fall Risk 19-24 = Moderate Fall Risk 25-28 = Low Fall Risk Tinetti ME. Performance-Oriented Assessment of Mobility Problems in Elderly Patients. [facility] 66; [id number]. (Scoring Description: PT Bulletin Feb. 10, 1993) Older adults: Antonio Cantrell et al, 2009; n = [address] elderly evaluated with ABC, NICOLLE, ADL, and IADL) · Mean NICOLLE score for males aged 69-68 years = 26.21(3.40) · Mean NICOLLE score for females age 69-68 years = 25.16(4.30) · Mean NICOLLE score for males over 80 years = 23.29(6.02) · Mean NICOLLE score for females over 80 years = 17.20(8.32) G codes: In compliance with CMSs Claims Based Outcome Reporting, the following G-code set was chosen for this patient based on their primary functional limitation being treated: The outcome measure chosen to determine the severity of the functional limitation was the Tinetti with a score of 8/28 which was correlated with the impairment scale. ? Mobility - Walking and Moving Around:  
  - CURRENT STATUS: CL - 60%-79% impaired, limited or restricted  - GOAL STATUS: CJ - 20%-39% impaired, limited or restricted  - D/C STATUS:  ---------------To be determined--------------- Physical Therapy Evaluation Charge Determination History Examination Presentation Decision-Making HIGH Complexity :3+ comorbidities / personal factors will impact the outcome/ POC  MEDIUM Complexity : 3 Standardized tests and measures addressing body structure, function, activity limitation and / or participation in recreation  MEDIUM Complexity : Evolving with changing characteristics  Other outcome measures Tinetti 8/28  HIGH Based on the above components, the patient evaluation is determined to be of the following complexity level: MEDIUM Pain: 
Pain Scale 1: Numeric (0 - 10) Pain Intensity 1: 3 Pain Location 1: Back Pain Orientation 1: Posterior Pain Description 1: Aching Pain Intervention(s) 1: Medication (see MAR) Activity Tolerance: Fair - 36' then [de-identified]' with RW and CGA to MIN A x 1 Please refer to the flowsheet for vital signs taken during this treatment. After treatment:  
[x]         Patient left in no apparent distress sitting up in chair 
[]         Patient left in no apparent distress in bed 
[x]         Call bell left within reach [x]         Nursing notified 
[x]         Caregiver present [x]         Bed alarm activated COMMUNICATION/EDUCATION:  
The patients plan of care was discussed with: Occupational Therapist and Registered Nurse. [x]         Fall prevention education was provided and the patient/caregiver indicated understanding. []         Patient/family have participated as able in goal setting and plan of care. [x]         Patient/family agree to work toward stated goals and plan of care. []         Patient understands intent and goals of therapy, but is neutral about his/her participation. []         Patient is unable to participate in goal setting and plan of care. Thank you for this referral. 
Drea Alexandre, PT Time Calculation: 38 mins

## 2018-12-10 NOTE — CDMP QUERY
Dear Dr. Marlene Bar, Please clarify if this patient is (was) being treated/managed for:  
 
=> Metabolic encephalopathy in the setting of hyponatremia with confusion and falls requiring IVF, lab monitoring, CT head, PT/OT, fall precautions and Neurology consult 
=> Other explanation of clinical findings  
=> Clinically Undetermined (no explanation for clinical findings) The medical record reflects the following clinical findings, treatment, and risk factors. Risk Factors:  adm with multiple fall Clinical Indicators:  cc diarrhea, unsteady gait/weakness, per H&P-Confusion/ short term memory loss. Possible developing dementia vs medication( narcotics and benzo use, hyponatremia , Hypokalemia 3.2, ct head-Left frontal deep brain stimulator. Mild white matter disease Treatment: NS infusion, KCL supplements, lab monitoring, CT head, Neurology consult, PT/OT, fall precautions Please clarify and document your clinical opinion in the progress notes and discharge summary including the definitive and/or presumptive diagnosis, (suspected or probable), related to the above clinical findings. Please include clinical findings supporting your diagnosis. Thank You 
Delvin Mcgarry,MSN,BSN,RN,[facility] 
[phone]

## 2018-12-10 NOTE — CONSULTS
Initial Psychiatric Consultation  Dr. Gertrude Orantes, Psychiatrist, [facility]    Saad Parra Char female 68 y.o. Chief Complaint: Depression and anxiety    History of Present Illness:  Examined patient in presence of son-in-law, spoke with son Jimenez Quiroz by phone, reviewed chart, spoke with nursing, charted. This is a patient admitted with hyponatremia and recent falls. She has a long history of anxiety (patient denies, son confirms) and was recently on Prozac but inconsistently; she has been on Xanax for years. The patient struggles to give her own history, repeating herself frequently. She denies a history of mood and anxiety problems although endorses being anxious this morning when talking about rehabilitation. No known history of psychiatric hospitalization, suicidal ideation or suicidal behaviors. Son Jimenez Quiroz notes that they have noticed subtle mental status changes over a couple of years but that the patient has been fairly successful continuing to live on her own. Neuropsychiatric testing is being recommended as an outpatient. The patient is reported to be significantly more confused in recent weeks, however. Her family is concerned also that she confuses her medications. She has had multiple recent falls.  reflects that she takes about . 25 mg of Xanax three times daily on average. Patient does drink but is very vague on amounts; son does not think it is daily drinking. Recent CIWA is 6, patient has not required prn ativan per protocol but is getting twice daily Xanax. ROS:  Gen - Calm patient, cooperative. Psych - No Suicidal ideation/Homicidal ideation/Psychotic/Manic symptoms, +for some obvious confusion.     Past Medical History:  Patient Active Problem List    Diagnosis Date Noted    Hyponatremia 12/08/2018    Fall 12/08/2018    Hyperlipidemia 12/08/2018    Acquired hypothyroidism 12/08/2018    Unsteady gait 12/08/2018    Hallux valgus (acquired) 10/18/2012 Past Medical History:   Diagnosis Date    Asthma     as a child    Hypercholesterolemia     Hypertension     Ill-defined condition     \"essential tremors with implant in chest on Left side under collar bone connected to the brain\"    Nausea & vomiting     Other ill-defined conditions(799.89)      high cholesterol, osteopenia    Psychiatric disorder     anxiety    Thyroid disease     hypothryroidism    Tremor        Past Psychiatric History: see hpi    Current Inpatient Medications:  Current Facility-Administered Medications   Medication Dose Route Frequency    LORazepam (ATIVAN) injection 2 mg  2 mg IntraVENous Q1H PRN    LORazepam (ATIVAN) injection 4 mg  4 mg IntraVENous Q1H PRN    [START ON 12/11/2018] thiamine mononitrate (B-1) tablet 100 mg  100 mg Oral DAILY    [START ON 12/11/2018] therapeutic multivitamin (THERAGRAN) tablet 1 Tab  1 Tab Oral DAILY    [START ON 12/91/5138] folic acid (FOLVITE) tablet 1 mg  1 mg Oral DAILY    acetaminophen (TYLENOL) tablet 650 mg  650 mg Oral Q6H PRN    propranolol (INDERAL) tablet 40 mg  40 mg Oral BID    And    propranolol (INDERAL) tablet 20 mg  20 mg Oral BID    traMADol (ULTRAM) tablet 50 mg  50 mg Oral Q6H PRN    sodium chloride (NS) flush 5-10 mL  5-10 mL IntraVENous Q8H    sodium chloride (NS) flush 5-10 mL  5-10 mL IntraVENous PRN    enoxaparin (LOVENOX) injection 40 mg  40 mg SubCUTAneous Q24H    ALPRAZolam (XANAX) tablet 0.25 mg  0.25 mg Oral BID PRN    atorvastatin (LIPITOR) tablet 40 mg  40 mg Oral DAILY    fluticasone (FLONASE) 50 mcg/actuation nasal spray 2 Spray  2 Spray Both Nostrils DAILY PRN    levothyroxine (SYNTHROID) tablet 100 mcg  100 mcg Oral Once per day on Sun Mon Tue Thu Fri    [START ON 12/12/2018] levothyroxine (SYNTHROID) tablet 50 mcg  50 mcg Oral Once per day on Wed Sat    loperamide (IMODIUM) capsule 2 mg  2 mg Oral QID PRN        Social History: Lives alone, support from family, drinking but unclear how much.    Psychiatric Family History: patient denies    Mental Status Examination:  Level of Consciousness:  Alert  Orientation:  Fully oriented  Appearance:  Normal grooming for situation  Cooperation:  Cooperative  Speech:  Normal rate/rhythm  Mood:  Mildly anxious  Affect:  Some restriction  Thought process:  Largely linear but some repetition evident  Suicidal Ideation:  None  Homicidal Ideation:  None  Perceptual Disturbances:  Does not appear to be responding to internal stimuli  Memory:  Knows president, addition and multiplication intact, aware of a lot of her current medical situation  Judgement/Insight:  fair  Attention/Concentration:  Adequate for interview  Psychomotor Agitation:  None    Vital Signs:    Visit Vitals  /84 (BP 1 Location: Left arm)   Pulse 68   Temp 98.3 °F (36.8 °C)   Resp 20   Ht 5' 2\" (1.575 m)   Wt 50.8 kg (112 lb)   SpO2 98%   BMI 20.49 kg/m²       Pertinent Labs/Studies:    Recent Results (from the past 24 hour(s))   CHLORIDE, URINE RANDOM    Collection Time: 12/09/18  4:40 PM   Result Value Ref Range    Chloride,urine random 24 MMOL/L   SODIUM, UR, RANDOM    Collection Time: 12/09/18  4:40 PM   Result Value Ref Range    Sodium,urine random 18 MMOL/L   POTASSIUM, UR, RANDOM    Collection Time: 12/09/18  4:40 PM   Result Value Ref Range    Potassium urine, random 28 MMOL/L   OSMOLALITY, UR    Collection Time: 12/09/18  4:40 PM   Result Value Ref Range    Osmolality,urine 321 MOSM/kg H2O   CBC W/O DIFF    Collection Time: 12/10/18  2:49 AM   Result Value Ref Range    WBC 6.3 3.6 - 11.0 K/uL    RBC 3.71 (L) 3.80 - 5.20 M/uL    HGB 11.3 (L) 11.5 - 16.0 g/dL    HCT 33.8 (L) 35.0 - 47.0 %    MCV 91.1 80.0 - 99.0 FL    MCH 30.5 26.0 - 34.0 PG    MCHC 33.4 30.0 - 36.5 g/dL    RDW 12.3 11.5 - 14.5 %    PLATELET 547 111 - 634 K/uL    MPV 8.9 8.9 - 12.9 FL    NRBC 0.0 0  WBC    ABSOLUTE NRBC 0.00 0.00 - 0.01 K/uL   VITAMIN B12    Collection Time: 12/10/18  3:43 AM   Result Value Ref Range    Vitamin B12 1,486 (H) 193 - 986 pg/mL   CORTISOL    Collection Time: 12/10/18  3:43 AM   Result Value Ref Range    Cortisol, random 59.6 ug/dL   METABOLIC PANEL, BASIC    Collection Time: 12/10/18  3:43 AM   Result Value Ref Range    Sodium 127 (L) 136 - 145 mmol/L    Potassium 3.7 3.5 - 5.1 mmol/L    Chloride 94 (L) 97 - 108 mmol/L    CO2 25 21 - 32 mmol/L    Anion gap 8 5 - 15 mmol/L    Glucose 99 65 - 100 mg/dL    BUN 7 6 - 20 MG/DL    Creatinine 0.50 (L) 0.55 - 1.02 MG/DL    BUN/Creatinine ratio 14 12 - 20      GFR est AA >60 >60 ml/min/1.73m2    GFR est non-AA >60 >60 ml/min/1.73m2    Calcium 8.1 (L) 8.5 - 10.1 MG/DL   MAGNESIUM    Collection Time: 12/10/18  3:43 AM   Result Value Ref Range    Magnesium 1.9 1.6 - 2.4 mg/dL   PHOSPHORUS    Collection Time: 12/10/18  3:43 AM   Result Value Ref Range    Phosphorus 2.6 2.6 - 4.7 MG/DL       Impression: This is a 68year old woman with an acute delirium caused by hyponatremia which appears to be improving. She has a long history of anxiety but had to discontinue Prozac due to hyponatremia - she has been taking Xanax for years. Suicide risk is low given no ideation and no history of behaviors; protective factors include clinical and social support; risk factors include age and mental/medical illness. It is very difficult to fully assess her mood and anxiety in the context of an acute delirium. I agree with stopping the SSRI due to hyponatremia. I also agree with gradually attempting to reduce the patient's Xanax use (she is currently getting . 25 mg bid) due to falls risk. Once sodium stabilizes, if daily treatment of anxiety is needed, I would suggest psychiatric follow up and possible initiation of mirtazapine which has a lower risk of hyponatremia than SSRIs. Psychiatric Diagnoses:  Delirium, Unspecified anxiety    Plan:  1. Continue Xanax . 25 mg po bid for another 5 days.   I would recommend a gradual taper of this medication after with goal of discontinuation (in 5 days consider . 25 mg po every day for 1-2 weeks then . 125 mg po every day for 1-2 weeks then dc. 2.  This is a patient that would benefit from psychiatric follow up after discharge to monitor anxiety and follow Xanax taper as well as possible initiation of other antianxiety treatment (consider mirtazapine). 3.  I agree with neuropsychiatric testing referral as an outpatient for longer term memory complaints. 4.  Family has just started trying to manage this patient's medication which if that patient returns home at some point is highly recommended. 50 minutes spent on floor. >50% in counseling/coordination of care activities as described above. I will sign off. Please call with further concerns. Jennifer Berry.  YENNY Zhu.

## 2018-12-10 NOTE — PROGRESS NOTES
[facility]: [facility] Neurology Ameliagilbert Ashton [phone] Name:   Arnel Moses Medical record #: [id number] Admission Date: 12/8/2018 Reason for Consult:  AMS Subjective:  
Overnight events: 
 
 
 
 
 
 
Objective: EEG: 
 
 
Assessment/Plan: 1. Altered Mental Status: · BP Goal: Less than 160 · Neurochecks:  Every 4 hours · Lab results: Remains hyponatremic at 127 · Awaiting MRI tomorrow: 
  
2. Mobility:  
· Has not been OOB. · PT/OT to eval for rehab 4. Diet:   
· Needs SLP 5. VTE Prophylaxes: · Per primary team 
· SCD's Thank you for allowing the Neurology Service the pleasure of participating in the care of your patient. This patient will be discussed with my collaborating care team physician Dr. Jannet Barragan and he may have further recommendations regarding this patient's care. Attending Attestation:  
 
I have reviewed the documentation provided by the nurse practitioner, Mrs. Brenden Hills, and we have discussed her findings and the clinical impression. I have formulated with her the proposed management plans for this patient. Additionally,  I have personally evaluated the patient to verify the history and to confirm physical findings. Below are my additional comments: 
CT cervical spine unrevealing According to Dr. Gisele Mondragon, our MRI folks may be able to do MRI with her DBS If that is the case, will get MRI c spine If not follow with Dr. Zeferino Mccann as outpatient Will return if MRI performed Thanks Carissa Palma MD 
 
 
 
 
 
 
 
 
  
PHYSICAL EXAMINATION: 
GENERAL:  Comfortable appearing, appropriate appearing. Wears glasses. She has her son-in-law at the bedside. VITAL SIGNS:   
Patient Vitals for the past 12 hrs: 
 Temp Pulse Resp BP SpO2  
12/10/18 0729 98.5 °F (36.9 °C) 68 17 148/89 98 % 12/10/18 0337 98.1 °F (36.7 °C) 60 18 167/74 96 % 12/09/18 2356 97.9 °F (36.6 °C) 66 18 167/77 97 % HEENT:  Sclerae are anicteric. Oropharynx is clear and moist. 
NECK:  Supple. EXTREMITIES:  Her pulses are symmetric. She is wearing a brace on her left hand noting that she wears it because she fractured her thumb on one of her falls. No edema. She is quite thin. NEUROLOGIC:  She is awake, alert, oriented to [facility], says it is December, somewhere around the ninth of tenth 2018 and she believes she is either on the fifth floor or the seventh floor. She says the current president is \"the orange one,\" and the previous President was [employer], \"my favorite\". She follows multistep commands. She is fluent. Registration 3/3 and her recall was 2/3 at 5 minutes and 3/3 with hints. Cranial nerves are intact II-XII. She does not have nystagmus. She has no pronation and no drift. She has minimal postural tremor of the right outstretched hand. She has a moderate degree of essential tremor with the left outstretched. There is no pronation and no drift. She resists fully in the upper and lower extremities in all muscle groups to direct testing. Reflexes are quite brisk in the upper extremities, i.e., pathologically brisk and she has Jara's bilaterally. Lower extremity reflexes are symmetrical and a bit depressed actually. Toes are mute. She has no ataxia, but she does have intention on finger-nose-finger and marked on the left and minimal on the right. Sensory intact to primary. Current Facility-Administered Medications Medication Dose Route Frequency Provider Last Rate Last Dose  LORazepam (ATIVAN) injection 2 mg  2 mg IntraVENous Q1H PRN Diane White MD      
 LORazepam (ATIVAN) injection 4 mg  4 mg IntraVENous Q1H PRN Diane White MD      
 acetaminophen (TYLENOL) tablet 650 mg  650 mg Oral Q6H PRN Wilfredo Theodore MD   650 mg at 12/10/18 9686  propranolol (INDERAL) tablet 40 mg  40 mg Oral BID Keyanna Saenz MD   40 mg at 12/10/18 8308  And  
  propranolol (INDERAL) tablet 20 mg  20 mg Oral BID Bruno Perales MD   20 mg at 12/10/18 9820  traMADol (ULTRAM) tablet 50 mg  50 mg Oral Q6H PRN Zac Lopez MD   50 mg at 12/10/18 0151  sodium chloride (NS) flush 5-10 mL  5-10 mL IntraVENous Q8H Wilfredo Michelle MD   10 mL at 12/10/18 3987  sodium chloride (NS) flush 5-10 mL  5-10 mL IntraVENous PRN Wilfredo Michelle MD      
 enoxaparin (LOVENOX) injection 40 mg  40 mg SubCUTAneous Q24H Wilfredo Michelle MD   40 mg at 12/09/18 2324  ALPRAZolam (XANAX) tablet 0.25 mg  0.25 mg Oral BID PRN Wilfredo Michelle MD   0.25 mg at 12/10/18 0236  
 atorvastatin (LIPITOR) tablet 40 mg  40 mg Oral DAILY Wilfredo Michelle MD   40 mg at 12/10/18 4143  fluticasone (FLONASE) 50 mcg/actuation nasal spray 2 Spray  2 Spray Both Nostrils DAILY PRN Wilfredo Hayden MD      
 levothyroxine (SYNTHROID) tablet 100 mcg  100 mcg Oral Once per day on Sun Mon Tue Thu Lucy Garza MD   100 mcg at 12/10/18 1514  [START ON 12/12/2018] levothyroxine (SYNTHROID) tablet 50 mcg  50 mcg Oral Once per day on Wed Sat Bruno Perales MD      
 loperamide (IMODIUM) capsule 2 mg  2 mg Oral QID PRN Bruno Perales MD   2 mg at 12/09/18 2327 Recent Results (from the past 24 hour(s)) METABOLIC PANEL, BASIC Collection Time: 12/09/18 10:50 AM  
Result Value Ref Range Sodium 127 (L) 136 - 145 mmol/L Potassium 3.8 3.5 - 5.1 mmol/L Chloride 93 (L) 97 - 108 mmol/L  
 CO2 26 21 - 32 mmol/L Anion gap 8 5 - 15 mmol/L Glucose 134 (H) 65 - 100 mg/dL BUN 10 6 - 20 MG/DL Creatinine 0.77 0.55 - 1.02 MG/DL  
 BUN/Creatinine ratio 13 12 - 20 GFR est AA >60 >60 ml/min/1.73m2 GFR est non-AA >60 >60 ml/min/1.73m2 Calcium 8.1 (L) 8.5 - 10.1 MG/DL CHLORIDE, URINE RANDOM Collection Time: 12/09/18  4:40 PM  
Result Value Ref Range Chloride,urine random 24 MMOL/L  
SODIUM, UR, RANDOM Collection Time: 12/09/18  4:40 PM  
Result Value Ref Range Sodium,urine random 18 MMOL/L  
POTASSIUM, UR, RANDOM Collection Time: 18  4:40 PM  
Result Value Ref Range Potassium urine, random 28 MMOL/L  
CBC W/O DIFF Collection Time: 12/10/18  2:49 AM  
Result Value Ref Range WBC 6.3 3.6 - 11.0 K/uL  
 RBC 3.71 (L) 3.80 - 5.20 M/uL  
 HGB 11.3 (L) 11.5 - 16.0 g/dL HCT 33.8 (L) 35.0 - 47.0 % MCV 91.1 80.0 - 99.0 FL  
 MCH 30.5 26.0 - 34.0 PG  
 MCHC 33.4 30.0 - 36.5 g/dL  
 RDW 12.3 11.5 - 14.5 % PLATELET 575 393 - 180 K/uL MPV 8.9 8.9 - 12.9 FL  
 NRBC 0.0 0  WBC ABSOLUTE NRBC 0.00 0.00 - 0.01 K/uL VITAMIN B12 Collection Time: 12/10/18  3:43 AM  
Result Value Ref Range Vitamin B12 1,486 (H) 193 - 986 pg/mL CORTISOL Collection Time: 12/10/18  3:43 AM  
Result Value Ref Range Cortisol, random 16.7 ug/dL METABOLIC PANEL, BASIC Collection Time: 12/10/18  3:43 AM  
Result Value Ref Range Sodium 127 (L) 136 - 145 mmol/L Potassium 3.7 3.5 - 5.1 mmol/L Chloride 94 (L) 97 - 108 mmol/L  
 CO2 25 21 - 32 mmol/L Anion gap 8 5 - 15 mmol/L Glucose 99 65 - 100 mg/dL BUN 7 6 - 20 MG/DL Creatinine 0.50 (L) 0.55 - 1.02 MG/DL  
 BUN/Creatinine ratio 14 12 - 20 GFR est AA >60 >60 ml/min/1.73m2 GFR est non-AA >60 >60 ml/min/1.73m2 Calcium 8.1 (L) 8.5 - 10.1 MG/DL MAGNESIUM Collection Time: 12/10/18  3:43 AM  
Result Value Ref Range Magnesium 1.9 1.6 - 2.4 mg/dL PHOSPHORUS Collection Time: 12/10/18  3:43 AM  
Result Value Ref Range Phosphorus 2.6 2.6 - 4.7 MG/DL Visit Vitals /89 (BP 1 Location: Right arm, BP Patient Position: At rest) Pulse 68 Temp 98.5 °F (36.9 °C) Resp 17 Ht 5' 2\" (1.575 m) Wt 50.8 kg (112 lb) SpO2 98% BMI 20.49 kg/m² O2 Device: Room air Temp (24hrs), Av.1 °F (36.7 °C), Min:97.7 °F (36.5 °C), Max:98.5 °F (36.9 °C) No intake/output data recorded.  1901 - 12/10 0700 In: 3428.8 [P.O.:1660; I.V.:1768.8] Out: 1200 [Urine:1200] Care Plan discussed with: 
Patient x Family RN Care Manager Consultant/Specialist:    
 
 
Cielo Moser, ACNP-BC

## 2018-12-10 NOTE — ROUTINE PROCESS
Bedside shift change report given to Zak Sims (oncoming nurse) by Marbella Fontana (offgoing nurse). Report included the following information SBAR, Kardex, Intake/Output, MAR, Accordion, Recent Results and Med Rec Status.

## 2018-12-10 NOTE — PROGRESS NOTES
IR 
The new MRI here may be able to do the patient with the DBS. Lead tech evaluating. Vertebra very \"flat\" anteriorly so would still get CT for planning. May be able to MRI tomorrow. Can not do Kyphoplasty today because biplane angio room down. Waiting for parts that have been delayed by weather.

## 2018-12-10 NOTE — PROGRESS NOTES
Awaiting CT of thoracic spine. Patient can not have a MRI due to DBS. If T10 compression fracture is present would proceed with kyphoplasty. IR consult after CT has been completed. TLSO has been ordered for the patient. Ok to ambulate with brace in place. Orthopedics to follow Melynda Closs, PA-C Orthopaedic Surgery  [facility]

## 2018-12-10 NOTE — PROGRESS NOTES
David Wang [place] [address] Saad Vazquez YOB: 1945 Assessment & Plan: 1. Hyponatremia 
- Na 121 to 127 mmol over 40 hrs 
- on Hypotonic solution to slow the correction 
- dc hypotonic fluids 
- cont fluid restriction 2. Hypokalemia 
- replete and repeat 3. ? AMS Subjective:  
CC:HYPONATREMIA 
HPI: Patient seen Has lots of liquid on the table. ? Abd Pain ? Reliable ROS Current Facility-Administered Medications Medication Dose Route Frequency  potassium chloride SR (KLOR-CON 10) tablet 20 mEq  20 mEq Oral NOW  acetaminophen (TYLENOL) tablet 650 mg  650 mg Oral Q6H PRN  propranolol (INDERAL) tablet 40 mg  40 mg Oral BID And  
 propranolol (INDERAL) tablet 20 mg  20 mg Oral BID  traMADol (ULTRAM) tablet 50 mg  50 mg Oral Q6H PRN  
 sodium chloride (NS) flush 5-10 mL  5-10 mL IntraVENous Q8H  
 sodium chloride (NS) flush 5-10 mL  5-10 mL IntraVENous PRN  
 enoxaparin (LOVENOX) injection 40 mg  40 mg SubCUTAneous Q24H  ALPRAZolam (XANAX) tablet 0.25 mg  0.25 mg Oral BID PRN  
 atorvastatin (LIPITOR) tablet 40 mg  40 mg Oral DAILY  fluticasone (FLONASE) 50 mcg/actuation nasal spray 2 Spray  2 Spray Both Nostrils DAILY PRN  
 levothyroxine (SYNTHROID) tablet 100 mcg  100 mcg Oral Once per day on Sun Mon Tue Thu Fri  
 [START ON 2018] levothyroxine (SYNTHROID) tablet 50 mcg  50 mcg Oral Once per day on Wed Sat  loperamide (IMODIUM) capsule 2 mg  2 mg Oral QID PRN Objective:  
 
Vitals: 
Blood pressure 148/89, pulse 68, temperature 98.5 °F (36.9 °C), resp. rate 17, height 5' 2\" (1.575 m), weight 50.8 kg (112 lb), SpO2 98 %. Temp (24hrs), Av.1 °F (36.7 °C), Min:97.7 °F (36.5 °C), Max:98.5 °F (36.9 °C) Intake and Output: 
No intake/output data recorded.  1901 - 12/10 0700 In: 3428.8 [P.O.:1660; I.V.:1768.8] Out: 1200 [Urine:1200] Physical Exam:              
 Patient is intubated:  no 
 Physical Examination:  
GENERAL ASSESSMENT: NAD HEENT:Nontraumatic CHEST: CTA HEART: S1S2 ABDOMEN: Soft,NT, 
:Reyna: n EXTREMITY: EDEMA n NEURO:Grossly non focal 
 
 
   
ECG/rhythm: 
 
Data Review No results for input(s): TNIPOC in the last 72 hours. No lab exists for component: ITNL Recent Labs 12/08/18 
1836 TROIQ <0.05 Recent Labs 12/10/18 
0343 12/10/18 
0249 12/09/18 
1050 12/09/18 
0110 12/08/18 
1836 *  --  127* 125* 121*  
K 3.7  --  3.8 3.6 3.2*  
CL 94*  --  93* 90* 85* CO2 25  --  26 24 26 BUN 7  --  10 9 11 CREA 0.50*  --  0.77 0.67 0.76 GLU 99  --  134* 141* 123* PHOS 2.6  --   --   --   --   
MG 1.9  --   --   --  2.2 CA 8.1*  --  8.1* 9.2 9.2 ALB  --   --   --   --  4.1 WBC  --  6.3  --  4.9 6.3 HGB  --  11.3*  --  12.4 11.8 HCT  --  33.8*  --  34.4* 33.1*  
PLT  --  382  --  372 346 No results for input(s): INR, PTP, APTT in the last 72 hours. No lab exists for component: INREXT Needs: urine analysis, urine sodium, protein and creatinine No results found for: [facility] Discussed with:  Nursing 
 
: Miki Martínez MD 
12/10/2018 [facility]: 
[email or web address] [place]je[place] Anon office: 
[address] [place] [address], [address] [place], [address] [place] [address] Phone: [phone] Fax :     [phone] [place] office: 
[address] [facility], [address] Phone - [phone] Fax - [phone]

## 2018-12-11 ENCOUNTER — APPOINTMENT (OUTPATIENT)
Dept: MRI IMAGING | Age: 73
DRG: 640 | End: 2018-12-11
Attending: PHYSICIAN ASSISTANT
Payer: MEDICARE

## 2018-12-11 ENCOUNTER — APPOINTMENT (OUTPATIENT)
Dept: CT IMAGING | Age: 73
DRG: 640 | End: 2018-12-11
Attending: INTERNAL MEDICINE
Payer: MEDICARE

## 2018-12-11 ENCOUNTER — APPOINTMENT (OUTPATIENT)
Dept: INTERVENTIONAL RADIOLOGY/VASCULAR | Age: 73
DRG: 640 | End: 2018-12-11
Attending: INTERNAL MEDICINE
Payer: MEDICARE

## 2018-12-11 LAB
ANION GAP SERPL CALC-SCNC: 7 MMOL/L (ref 5–15)
ANION GAP SERPL CALC-SCNC: 8 MMOL/L (ref 5–15)
ANION GAP SERPL CALC-SCNC: 9 MMOL/L (ref 5–15)
BASOPHILS # BLD: 0 K/UL (ref 0–0.1)
BASOPHILS NFR BLD: 1 % (ref 0–1)
BUN SERPL-MCNC: 10 MG/DL (ref 6–20)
BUN SERPL-MCNC: 5 MG/DL (ref 6–20)
BUN SERPL-MCNC: 6 MG/DL (ref 6–20)
BUN/CREAT SERPL: 11 (ref 12–20)
BUN/CREAT SERPL: 11 (ref 12–20)
BUN/CREAT SERPL: 14 (ref 12–20)
CALCIUM SERPL-MCNC: 8.7 MG/DL (ref 8.5–10.1)
CALCIUM SERPL-MCNC: 8.7 MG/DL (ref 8.5–10.1)
CALCIUM SERPL-MCNC: 8.8 MG/DL (ref 8.5–10.1)
CHLORIDE SERPL-SCNC: 87 MMOL/L (ref 97–108)
CHLORIDE SERPL-SCNC: 89 MMOL/L (ref 97–108)
CHLORIDE SERPL-SCNC: 91 MMOL/L (ref 97–108)
CO2 SERPL-SCNC: 24 MMOL/L (ref 21–32)
CO2 SERPL-SCNC: 25 MMOL/L (ref 21–32)
CO2 SERPL-SCNC: 25 MMOL/L (ref 21–32)
COMMENT, HOLDF: NORMAL
CREAT SERPL-MCNC: 0.46 MG/DL (ref 0.55–1.02)
CREAT SERPL-MCNC: 0.53 MG/DL (ref 0.55–1.02)
CREAT SERPL-MCNC: 0.74 MG/DL (ref 0.55–1.02)
DIFFERENTIAL METHOD BLD: ABNORMAL
EOSINOPHIL # BLD: 0 K/UL (ref 0–0.4)
EOSINOPHIL NFR BLD: 1 % (ref 0–7)
ERYTHROCYTE [DISTWIDTH] IN BLOOD BY AUTOMATED COUNT: 12.3 % (ref 11.5–14.5)
FOLATE SERPL-MCNC: 11.8 NG/ML (ref 5–21)
GLUCOSE SERPL-MCNC: 103 MG/DL (ref 65–100)
GLUCOSE SERPL-MCNC: 114 MG/DL (ref 65–100)
GLUCOSE SERPL-MCNC: 133 MG/DL (ref 65–100)
HCT VFR BLD AUTO: 32.9 % (ref 35–47)
HGB BLD-MCNC: 11.5 G/DL (ref 11.5–16)
IMM GRANULOCYTES # BLD: 0 K/UL (ref 0–0.04)
IMM GRANULOCYTES NFR BLD AUTO: 0 % (ref 0–0.5)
LYMPHOCYTES # BLD: 1.1 K/UL (ref 0.8–3.5)
LYMPHOCYTES NFR BLD: 21 % (ref 12–49)
MCH RBC QN AUTO: 31.2 PG (ref 26–34)
MCHC RBC AUTO-ENTMCNC: 35 G/DL (ref 30–36.5)
MCV RBC AUTO: 89.2 FL (ref 80–99)
MONOCYTES # BLD: 0.5 K/UL (ref 0–1)
MONOCYTES NFR BLD: 11 % (ref 5–13)
NEUTS SEG # BLD: 3.4 K/UL (ref 1.8–8)
NEUTS SEG NFR BLD: 67 % (ref 32–75)
NRBC # BLD: 0 K/UL (ref 0–0.01)
NRBC BLD-RTO: 0 PER 100 WBC
PLATELET # BLD AUTO: 382 K/UL (ref 150–400)
PMV BLD AUTO: 8.9 FL (ref 8.9–12.9)
POTASSIUM SERPL-SCNC: 3.4 MMOL/L (ref 3.5–5.1)
POTASSIUM SERPL-SCNC: 3.4 MMOL/L (ref 3.5–5.1)
POTASSIUM SERPL-SCNC: 3.5 MMOL/L (ref 3.5–5.1)
RBC # BLD AUTO: 3.69 M/UL (ref 3.8–5.2)
SAMPLES BEING HELD,HOLD: NORMAL
SODIUM SERPL-SCNC: 120 MMOL/L (ref 136–145)
SODIUM SERPL-SCNC: 122 MMOL/L (ref 136–145)
SODIUM SERPL-SCNC: 123 MMOL/L (ref 136–145)
WBC # BLD AUTO: 5.1 K/UL (ref 3.6–11)

## 2018-12-11 PROCEDURE — 74011250637 HC RX REV CODE- 250/637: Performed by: INTERNAL MEDICINE

## 2018-12-11 PROCEDURE — 97116 GAIT TRAINING THERAPY: CPT

## 2018-12-11 PROCEDURE — 36415 COLL VENOUS BLD VENIPUNCTURE: CPT

## 2018-12-11 PROCEDURE — 97530 THERAPEUTIC ACTIVITIES: CPT

## 2018-12-11 PROCEDURE — 97535 SELF CARE MNGMENT TRAINING: CPT

## 2018-12-11 PROCEDURE — 80048 BASIC METABOLIC PNL TOTAL CA: CPT

## 2018-12-11 PROCEDURE — 74011250636 HC RX REV CODE- 250/636: Performed by: INTERNAL MEDICINE

## 2018-12-11 PROCEDURE — 85025 COMPLETE CBC W/AUTO DIFF WBC: CPT

## 2018-12-11 PROCEDURE — 65660000000 HC RM CCU STEPDOWN

## 2018-12-11 PROCEDURE — 94760 N-INVAS EAR/PLS OXIMETRY 1: CPT

## 2018-12-11 PROCEDURE — 84425 ASSAY OF VITAMIN B-1: CPT

## 2018-12-11 RX ORDER — SODIUM CHLORIDE 0.9 % (FLUSH) 0.9 %
5-10 SYRINGE (ML) INJECTION AS NEEDED
Status: DISCONTINUED | OUTPATIENT
Start: 2018-12-11 | End: 2018-12-13 | Stop reason: HOSPADM

## 2018-12-11 RX ORDER — HYDRALAZINE HYDROCHLORIDE 20 MG/ML
10 INJECTION INTRAMUSCULAR; INTRAVENOUS ONCE
Status: ACTIVE | OUTPATIENT
Start: 2018-12-11 | End: 2018-12-11

## 2018-12-11 RX ORDER — LORAZEPAM 2 MG/ML
1 INJECTION INTRAMUSCULAR
Status: DISCONTINUED | OUTPATIENT
Start: 2018-12-11 | End: 2018-12-13 | Stop reason: HOSPADM

## 2018-12-11 RX ORDER — HYDRALAZINE HYDROCHLORIDE 20 MG/ML
20 INJECTION INTRAMUSCULAR; INTRAVENOUS
Status: DISCONTINUED | OUTPATIENT
Start: 2018-12-11 | End: 2018-12-13 | Stop reason: HOSPADM

## 2018-12-11 RX ORDER — 3% SODIUM CHLORIDE 3 G/100ML
25 INJECTION, SOLUTION INTRAVENOUS CONTINUOUS
Status: DISPENSED | OUTPATIENT
Start: 2018-12-11 | End: 2018-12-11

## 2018-12-11 RX ORDER — SODIUM CHLORIDE 0.9 % (FLUSH) 0.9 %
5-10 SYRINGE (ML) INJECTION EVERY 8 HOURS
Status: DISCONTINUED | OUTPATIENT
Start: 2018-12-11 | End: 2018-12-13 | Stop reason: HOSPADM

## 2018-12-11 RX ORDER — LORAZEPAM 2 MG/ML
2 INJECTION INTRAMUSCULAR
Status: DISCONTINUED | OUTPATIENT
Start: 2018-12-11 | End: 2018-12-13 | Stop reason: HOSPADM

## 2018-12-11 RX ADMIN — ACETAMINOPHEN 650 MG: 325 TABLET, FILM COATED ORAL at 00:36

## 2018-12-11 RX ADMIN — SODIUM CHLORIDE 25 ML/HR: 3 INJECTION, SOLUTION INTRAVENOUS at 13:32

## 2018-12-11 RX ADMIN — Medication 100 MG: at 10:24

## 2018-12-11 RX ADMIN — Medication 10 ML: at 13:37

## 2018-12-11 RX ADMIN — ATORVASTATIN CALCIUM 40 MG: 10 TABLET, FILM COATED ORAL at 10:22

## 2018-12-11 RX ADMIN — PROPRANOLOL HYDROCHLORIDE 20 MG: 10 TABLET ORAL at 21:05

## 2018-12-11 RX ADMIN — LEVOTHYROXINE SODIUM 100 MCG: 100 TABLET ORAL at 10:21

## 2018-12-11 RX ADMIN — ALPRAZOLAM 0.25 MG: 0.25 TABLET ORAL at 22:41

## 2018-12-11 RX ADMIN — THERA TABS 1 TABLET: TAB at 10:23

## 2018-12-11 RX ADMIN — FOLIC ACID 1 MG: 1 TABLET ORAL at 10:22

## 2018-12-11 RX ADMIN — Medication 10 ML: at 21:06

## 2018-12-11 RX ADMIN — SODIUM CHLORIDE 25 ML/HR: 3 INJECTION, SOLUTION INTRAVENOUS at 19:56

## 2018-12-11 RX ADMIN — ALPRAZOLAM 0.25 MG: 0.25 TABLET ORAL at 10:24

## 2018-12-11 RX ADMIN — ENOXAPARIN SODIUM 40 MG: 40 INJECTION SUBCUTANEOUS at 21:02

## 2018-12-11 RX ADMIN — ACETAMINOPHEN 650 MG: 325 TABLET, FILM COATED ORAL at 18:45

## 2018-12-11 RX ADMIN — PROPRANOLOL HYDROCHLORIDE 20 MG: 10 TABLET ORAL at 10:23

## 2018-12-11 RX ADMIN — PROPRANOLOL HYDROCHLORIDE 40 MG: 10 TABLET ORAL at 10:22

## 2018-12-11 RX ADMIN — Medication 10 ML: at 06:47

## 2018-12-11 RX ADMIN — PROPRANOLOL HYDROCHLORIDE 40 MG: 10 TABLET ORAL at 21:05

## 2018-12-11 NOTE — ROUTINE PROCESS
Attempted to schedule kyphoplasty for tomorrow 12/12/18. Spoke with Jasen Allen RN. Patient's . Please call IR when patient more stable for kyphoplasty.

## 2018-12-11 NOTE — PROGRESS NOTES
Problem: Falls - Risk of  Goal: *Absence of Falls  Document Vanesa Fall Risk and appropriate interventions in the flowsheet. Outcome: Progressing Towards Goal  Fall Risk Interventions:  Mobility Interventions: Patient to call before getting OOB    Mentation Interventions: Bed/chair exit alarm    Medication Interventions: Bed/chair exit alarm, Evaluate medications/consider consulting pharmacy, Patient to call before getting OOB, Teach patient to arise slowly    Elimination Interventions: Bed/chair exit alarm, Call light in reach, Patient to call for help with toileting needs, Toilet paper/wipes in reach, Toileting schedule/hourly rounds    History of Falls Interventions: Bed/chair exit alarm, Door open when patient unattended, Room close to nurse's station        Problem: Alcohol Withdrawal  Goal: *STG: Participates in treatment plan  Outcome: Progressing Towards Goal  Pt encouraged to feed herself, ambulate to toilet with assistance, and to provide some self hygiene care. Pt amiable to all tasks. Goal: *STG: Seeks staff when symptoms of withdrawal increase  Outcome: Progressing Towards Goal  Pt will not state how much she drinks despite multiple ways of inquiry. Goal: *STG: Will identify negative impact of chemical dependency including the use of tobacco, alcohol, and other substances  Outcome: Not Progressing Towards Goal  Pt evasive about questions regarding alcohol use. Goal: Interventions  Outcome: Progressing Towards Goal  CIWA Q2. CIWA Score 2 since noon. VSS. Problem: Pressure Injury - Risk of  Goal: *Prevention of pressure injury  Document Js Scale and appropriate interventions in the flowsheet. Outcome: Progressing Towards Goal  Pressure Injury Interventions:             Activity Interventions: Increase time out of bed, Pressure redistribution bed/mattress(bed type), PT/OT evaluation    Mobility Interventions: Assess need for specialty bed, Float heels, Pressure redistribution bed/mattress (bed type), PT/OT evaluation, Turn and reposition approx.  every two hours(pillow and wedges)    Nutrition Interventions: Document food/fluid/supplement intake, Offer support with meals,snacks and hydration    Friction and Shear Interventions: Apply protective barrier, creams and emollients, Feet elevated on foot rest, Minimize layers

## 2018-12-11 NOTE — ROUTINE PROCESS
1118: .TRANSFER - IN REPORT:    Verbal report received from [id number] [facility] on Saad DESTINEE Vazquez  being received from 5th Floor for ordered transfer. Report consisted of patients Situation, Background, Assessment and   Recommendations(SBAR). Information from the following report(s)SBAR, Kardex, ED Summary, Intake/Output, MAR, Accordion, and Recent Results was reviewed with the receiving nurse. Opportunity for questions and clarification was provided. Assessment completed upon patients arrival to unit and care assumed. Primary Nurse Kareen Corona RN and Betito Gunn RN performed a dual skin assessment on this patient Impairment noted- see wound doc flow sheet  Js score is 18. Pressure injury noted in the thin outline of a bedpan. There is a thin line of nonblanchable erythema in the shape of a bedpan on her buttocks bilaterally. 1500: Bedside and Verbal shift change report given to Lillian Wolff RN by Dimas Arriola RN. Report included the following information SBAR, Kardex, ED Summary, Intake/Output, MAR, Accordion, Recent Results and Cardiac Rhythm NSR. Thad Fuchs

## 2018-12-11 NOTE — PROGRESS NOTES
Pt was transported to the ICU with 2 PCT's and RN. Personal belongings were transferred with her as well. Pt on monitor in NSR. Report was called by primary nurse. Son accompanied patient.

## 2018-12-11 NOTE — PROGRESS NOTES
David Wilkins [facility]  [address], [place], [address] [facility]  [phone]      Medical Progress Note      NAME: Marian Bruner   :  1945  MRM:  [id number]    Date/Time: 2018        Subjective:     Chief Complaint: Pt seen and examined this AM. Initially on my assessment pt was somnolent and only minimally aroused to sternal rub. Appeared to have periodic \"jerking\" movement. Resisted her eyes being opened and withdrew to pain. Son in law arrived at the bedside and pt shortly thereafter woke. She was agitated, combative and confused. BMP noted this AM. Attempted repeat BMP to confirm Na+. Transferred after discussions with nephrology about need to start hypertonic saline if Na+ confirmed that low in the setting of AMS       Objective:       Vitals:        Last 24hrs VS reviewed since prior progress note. Most recent are:    Visit Vitals  /88 (BP 1 Location: Left arm, BP Patient Position: At rest)   Pulse 61   Temp 97.7 °F (36.5 °C)   Resp 12   Ht 5' 2\" (1.575 m)   Wt 52.2 kg (115 lb 1.3 oz)   SpO2 99%   BMI 21.05 kg/m²     SpO2 Readings from Last 6 Encounters:   18 99%   14 100%   12 100%            Intake/Output Summary (Last 24 hours) at 2018 1153  Last data filed at 12/10/2018 1955  Gross per 24 hour   Intake 600 ml   Output    Net 600 ml          Exam:     Physical Exam:    Gen: Somnolent. Period jerking movements. Withdrawals to pain and attempt to open her eyes   HEENT:  Sclerae nonicteric, hearing intact to voice, mucous membranes moist  Neck:  Supple, without masses. Resp:  No accessory muscle use, CTAB without wheezes, rales, or rhonchi  Card: RRR, without m/r/g. No LE edema. Abd:  +bowel sounds, soft, NTTP, nondistended. No HSM. Neuro: See above   Psych:  Confused.  Poor insight      Medications Reviewed: (see below)    Lab Data Reviewed: (see below)    ______________________________________________________________________    Medications:     Current Facility-Administered Medications   Medication Dose Route Frequency    hydrALAZINE (APRESOLINE) 20 mg/mL injection 10 mg  10 mg IntraVENous ONCE    hydrALAZINE (APRESOLINE) 20 mg/mL injection 20 mg  20 mg IntraVENous Q6H PRN    LORazepam (ATIVAN) injection 1 mg  1 mg IntraVENous Q1H PRN    LORazepam (ATIVAN) injection 2 mg  2 mg IntraVENous Q1H PRN    thiamine mononitrate (B-1) tablet 100 mg  100 mg Oral DAILY    therapeutic multivitamin (THERAGRAN) tablet 1 Tab  1 Tab Oral DAILY    folic acid (FOLVITE) tablet 1 mg  1 mg Oral DAILY    acetaminophen (TYLENOL) tablet 650 mg  650 mg Oral Q6H PRN    propranolol (INDERAL) tablet 40 mg  40 mg Oral BID    And    propranolol (INDERAL) tablet 20 mg  20 mg Oral BID    traMADol (ULTRAM) tablet 50 mg  50 mg Oral Q6H PRN    sodium chloride (NS) flush 5-10 mL  5-10 mL IntraVENous Q8H    sodium chloride (NS) flush 5-10 mL  5-10 mL IntraVENous PRN    enoxaparin (LOVENOX) injection 40 mg  40 mg SubCUTAneous Q24H    ALPRAZolam (XANAX) tablet 0.25 mg  0.25 mg Oral BID PRN    atorvastatin (LIPITOR) tablet 40 mg  40 mg Oral DAILY    fluticasone (FLONASE) 50 mcg/actuation nasal spray 2 Spray  2 Spray Both Nostrils DAILY PRN    levothyroxine (SYNTHROID) tablet 100 mcg  100 mcg Oral Once per day on Sun Mon Tue Thu Fri    [START ON 12/12/2018] levothyroxine (SYNTHROID) tablet 50 mcg  50 mcg Oral Once per day on Wed Sat    loperamide (IMODIUM) capsule 2 mg  2 mg Oral QID PRN            Lab Review:     Recent Labs     12/11/18  0701 12/10/18  0249 12/09/18  0110   WBC 5.1 6.3 4.9   HGB 11.5 11.3* 12.4   HCT 32.9* 33.8* 34.4*    382 372     Recent Labs     12/11/18  1049 12/11/18  0701 12/10/18  0343  12/08/18  1836   * 122* 127*   < > 121*   K 3.4* 3.5 3.7   < > 3.2*   CL 87* 89* 94*   < > 85*   CO2 24 25 25   < > 26   * 114* 99   < > 123*   BUN 6 5* 7   < > 11   CREA 0.53* 0.46* 0.50*   < > 0.76   CA 8.8 8.7 8.1*   < > 9.2   MG  --   --  1.9  --  2.2   PHOS  --   --  2.6  --   --    ALB  --   --   --   --  4.1   SGOT  --   --   --   --  31   ALT  --   --   --   --  33    < > = values in this interval not displayed. No components found for: GLPOC  No results for input(s): PH, PCO2, PO2, HCO3, FIO2 in the last 72 hours. No results for input(s): INR in the last 72 hours. No lab exists for component: INREXT, INREXT  Lab Results   Component Value Date/Time    Specimen Description: URINE 07/29/2009 05:29 PM     Lab Results   Component Value Date/Time    Culture result: ESCHERICHIA COLI 07/29/2009 05:29 PM            Assessment / Plan:   Metabolic encephalopathy in the setting of hyponatremia with confusion and falls requiring IVF, lab monitoring, CT head, PT/OT, fall precautions and Neurology consult  -suspect likely multifactorial. Possibly 2/2 alcohol use/abuse, hyponatremia and meds   -neurology on board; appreciate assistance   -head CT without acute process  -CT C and T spine without acute neurologic findings other than T10 and T11 fractures (see below)   -treat hyponatremia; see below      Hyponatremia: acute on chronic. 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 Head CT showed no acute process. 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  No diagnosis of Parkinson's  -PT/OT eval   -may need IPR   -suspect alcohol use may be contributing to gait instability   -neurology on board; appreciate assistance      Confusion/short term memory loss - suspect 2/2 dementia, meds, alcohol use   -will need neuropsych eval as an outpt   -B12 WNL, awaiting folate and thiamine      Hyperlipidemia:   -statin     Acquired hypothyroidism (): TSH WNR  -continue LT4     Raghavendra tremors:   -continue propranolol, deep brain stimulator implanted as above     Anxiety/ depression: Xanax PRN would NOT be a good long-term choice for her due to age, alcohol use and gait instability, and her SSRI is held due to possible SIADH  -psych eval appreciated; will begin xanax taper once mental status stabilizes     T10/T11 fractures   -TLSO brace   -PT/OT eval  -ortho on board; likely will need intervention     Chronic diarrhea: reportedly had full workup with her PCP and according to pt and daughter. Would follow up outpatient.  On imodium.      Alcohol abuse - suspect severe   -CIWA protocol   -thiamine, folic acid, MVI     Total time spent: 35 minutes                    Care Plan discussed with: Patient, Nursing Staff and >50% of time spent in counseling and coordination of care    Discussed:  Care Plan and D/C Planning    Prophylaxis:  Lovenox    Disposition:  TBD         ___________________________________________________    Attending Physician: Grover Cadena MD

## 2018-12-11 NOTE — PROGRESS NOTES
Problem: Mobility Impaired (Adult and Pediatric)  Goal: *Acute Goals and Plan of Care (Insert Text)  Physical Therapy Goals  Initiated 12/10/2018    1. Patient will move from supine to sit and sit to supine , scoot up and down and roll side to side in bed with supervision/set-up within 4 days. 2. Patient will perform sit to stand with supervision/set-up within 4 days. 3. Patient will ambulate with supervision/set-up for 200 feet with the least restrictive device within 4 days. 4. Patient will ascend/descend 2 stairs with 1 handrail(s) with minimal assistance/contact guard assist within 4 days. 5. Patient will verbalize and demonstrate understanding of spinal precautions (No bending, lifting greater than 5 lbs, or twisting; log-roll technique; frequent repositioning as instructed) within 4 days. physical Therapy TREATMENT  Patient: Marian Bruner (38 y.o. female)  Date: 12/11/2018  Diagnosis: Hyponatremia  Hyponatremia  Hyponatremia Hyponatremia      Precautions: Back  Chart, physical therapy assessment, plan of care and goals were reviewed. ASSESSMENT:  Pt received in IVCU bed with recent transfer due to anxiety, agitation and confusion. Pt pleasant and cooperative with chronic tremors. Hx of deep brain stimulator implanted last yr and has hx of ETOH abuse. Pt educated with back protocol due to dx of T10 compression tx and T11 burst fx. Educated with log rolling and BLT's. Fitted with TLSO yesterday,yet pt stating that it is uncomfortable. Pt stating that ortho MD assessing if pt symptoms improved with bracing vs kyphoplasty. Supine to sit with Min to CGA. Sit to stand with RW and CGA. Stating bathroom needs. Gait of 5' and toileted with CGA. Pt unable to void and RN notified. Pt agreeable to walk in room to assess back pain. Gait of 36' with RW CGA without c/o pain. Returned to bed with CGA in NAD. Will need care at home for safety or rehab.   Progression toward goals:  []    Improving appropriately and progressing toward goals  [x]    Improving slowly and progressing toward goals  []    Not making progress toward goals and plan of care will be adjusted     PLAN:  Patient continues to benefit from skilled intervention to address the above impairments. Continue treatment per established plan of care. Discharge Recommendations:  Rehab  Further Equipment Recommendations for Discharge:  tbd     SUBJECTIVE:   Patient stated I am just so nervous.     OBJECTIVE DATA SUMMARY:   Critical Behavior:  Neurologic State: Alert  Orientation Level: Oriented X4(Talks tangentially)  Cognition: Appropriate decision making, Appropriate for age attention/concentration, Appropriate safety awareness, Follows commands  Safety/Judgement: Decreased insight into deficits, Decreased awareness of need for safety, Decreased awareness of need for assistance  Functional Mobility Training:  Bed Mobility:  Rolling: Contact guard assistance  Supine to Sit: Minimum assistance  Sit to Supine: Contact guard assistance  Scooting: Contact guard assistance        Transfers:  Sit to Stand: Minimum assistance;Contact guard assistance  Stand to Sit: Contact guard assistance                             Balance:  Sitting: Intact  Standing: Intact; With support  Standing - Static: Good;Constant support  Standing - Dynamic : Good  Ambulation/Gait Training:  Distance (ft): 40 Feet (ft)  Assistive Device: Walker, rolling;Gait belt  Ambulation - Level of Assistance: Contact guard assistance        Gait Abnormalities: Path deviations              Speed/Hellen: Slow                              Pain:  Pain Scale 1: Numeric (0 - 10)  Pain Intensity 1: 0              Activity Tolerance:   fair  Please refer to the flowsheet for vital signs taken during this treatment.   After treatment:   []    Patient left in no apparent distress sitting up in chair  [x]    Patient left in no apparent distress in bed  [x]    Call bell left within reach  [x] Nursing notified  [x]    Caregiver present  []    Bed alarm activated    COMMUNICATION/COLLABORATION:   The patients plan of care was discussed with: Occupational Therapist and Registered Nurse    Benigno Sethi, PT   Time Calculation: 30 mins

## 2018-12-11 NOTE — PROGRESS NOTES
Na 120. Start 3%. 25 cc per hour for 4 hour, total 100 cc. dw nurse. Labs in 4 hour.   Goal Na 126-128 mmol

## 2018-12-11 NOTE — PROGRESS NOTES
Shift Summary    1118: Report received and care assumed. Pt received into ICU 1. Pt oriented x 4, answers all questions correctly, but talks tangentially. Pt smiling and cooperative. Team members made aware of pt's behavior upstairs for safety. Rapport established with pt by RN and tech. Seizure pads placed on bed.     1200: No orders for 3% Saline yet. Dr. Carter Marlow paged to report  [id number] BMP results. CIWA 2    1210: Orders received for 3% Saline. 1300: Awaiting 3% NS. Pharmacy made aware. 1332: 3% NS started. 1400: RN spoke with Dr. Sloane Quezada about EEG. EEG no longer needed and cancelled. MRI will be scheduled for tomorrow due to patient agitation earlier and at time impulsive behavior. CIWA 2. Pt to have BMP rechecked at 1730 per Dr. Duglas Barker order with the 3% NS start time. Dr. Carter Marlow paged to clarify orders. 1445: Dr. Machado Sorenson called back and made aware of pt progress. He has ordered for BMP to be Q4 starting at 1730.     1450: PT/OT in room working with the pt.     1500: Report given to Kd Andino RN.

## 2018-12-11 NOTE — PROGRESS NOTES
Bedside and Verbal shift change report given to Cherry Perez (oncoming nurse) by Ifrah Tinajero (offgoing nurse). Report included the following information SBAR, Kardex, Procedure Summary, Intake/Output, MAR, Accordion, Recent Results, Med Rec Status and Cardiac Rhythm sinus rhythm. 6:11 PM  Paged [facility] to report Sodium of 123. Bedside and Verbal shift change report given to Audrey Hdz (oncoming nurse) by Selena Curling RN (offgoing nurse). Report included the following information SBAR, Kardex, Intake/Output, MAR, Accordion, Recent Results, Med Rec Status and Cardiac Rhythm sinus rhythm.

## 2018-12-11 NOTE — PROGRESS NOTES
TRANSFER - OUT REPORT: 
 
Verbal report [facility](name) on Saad DESTINEE Vazquez  being transferred to ICU(unit) for routine progression of care Report consisted of patients Situation, Background, Assessment and  
Recommendations(SBAR). Information from the following report(s) SBAR, Kardex, ED Summary, Procedure Summary, Intake/Output, MAR and Recent Results was reviewed with the receiving nurse. Lines:  
Peripheral IV 12/08/18 Anterior;Right Antecubital (Active) Site Assessment Clean, dry, & intact 12/11/2018  4:17 AM  
Phlebitis Assessment 0 12/11/2018  4:17 AM  
Infiltration Assessment 0 12/11/2018  4:17 AM  
Dressing Status Clean, dry, & intact 12/11/2018  4:17 AM  
Dressing Type Transparent 12/11/2018  4:17 AM  
Hub Color/Line Status Pink;Capped 12/11/2018  4:17 AM  
Action Taken Open ports on tubing capped 12/10/2018  1:51 AM  
Alcohol Cap Used Yes 12/10/2018  1:51 AM  
  
 
Opportunity for questions and clarification was provided. Patient transported with: 
 Monitor Registered Nurse Tech  
 
1045 Patient less agitated and was able to take AM meds. Labs drawn and urine sample pending.

## 2018-12-11 NOTE — PROGRESS NOTES
Bedside shift change report given to Evin Chavez RN (oncoming nurse) by Alfonso Knowles RN (offgoing nurse). Report included the following information SBAR, Kardex and MAR.

## 2018-12-11 NOTE — PROGRESS NOTES
Problem: Self Care Deficits Care Plan (Adult)  Goal: *Acute Goals and Plan of Care (Insert Text)  Occupational Therapy Goals  Initiated 12/10/2018    1. Patient will perform lower body dressing with supervision/set-up using AE PRN within 7 days. 2.  Patient will perform toilet transfers with supervision/set-up using most appropriate DME within 7 days. 3.  Patient will perform grooming, standing at sink, at supervision/set-up within 7 days. 4.  Patient will don/doff back brace at supervision/set-up within 7 days. 5.  Patient will verbalize/demonstrate 3/3 back precautions during ADL tasks without cues within 7 days. Occupational Therapy TREATMENT  Patient: Mine Patel (80 y.o. female)  Date: 12/11/2018  Diagnosis: Hyponatremia  Hyponatremia  Hyponatremia Hyponatremia      Precautions: Back  Chart, occupational therapy assessment, plan of care, and goals were reviewed. ASSESSMENT:  Patient with recent transfer to IVCU due to increased anxiety, agitation, confusion and low sodium. Patient received in bed, sleeping. Patient son in law present during activity. Patient educated on back precautions and able to perform log roll for bed mobility with increased time and verbal/tactile cues. Patient sitting EOB requesting need for toileting. Patient transferred with min assist to commode in room but unable to void. Patient reports increased anxiety with attempt to void and unsuccessful. Patient returned to bed at end of session secondary to reported fatigue. Patient will require rehab setting vs home with 24hr assist.   .  Progression toward goals:  [x]       Improving appropriately and progressing toward goals  []       Improving slowly and progressing toward goals  []       Not making progress toward goals and plan of care will be adjusted     PLAN:  Patient continues to benefit from skilled intervention to address the above impairments. Continue treatment per established plan of care.   Discharge Recommendations:  Rehab and Home Health with supervision  Further Equipment Recommendations for Discharge:  TBD     SUBJECTIVE:   Patient stated I cant answer that right now.     OBJECTIVE DATA SUMMARY:   Cognitive/Behavioral Status:  Neurologic State: Alert  Orientation Level: Oriented X4(Talks tangentially)  Cognition: Appropriate decision making; Appropriate for age attention/concentration; Appropriate safety awareness; Follows commands             Functional Mobility and Transfers for ADLs:  Bed Mobility:  Rolling: Contact guard assistance  Supine to Sit: Minimum assistance  Sit to Supine: Contact guard assistance  Scooting: Contact guard assistance    Transfers:  Sit to Stand: Minimum assistance;Contact guard assistance  Functional Transfers  Toilet Transfer : Minimum assistance       Balance:  Sitting: Intact  Standing: Intact; With support  Standing - Static: Good;Constant support  Standing - Dynamic : Good    ADL Intervention:                                Toileting  Toileting Assistance: Minimum assistance  Bladder Hygiene: Minimum assistance  Clothing Management: Minimum assistance  Cues: Verbal cues provided(physical assist for balance )  Adaptive Equipment: Walker         Neuro Re-Education:           Therapeutic Exercises:     Pain:  Pain Scale 1: Numeric (0 - 10)  Pain Intensity 1: 0              Activity Tolerance:   VSS  Please refer to the flowsheet for vital signs taken during this treatment.   After treatment:   [] Patient left in no apparent distress sitting up in chair  [x] Patient left in no apparent distress in bed  [x] Call bell left within reach  [x] Nursing notified  [x] Caregiver present  [x] Bed alarm activated    COMMUNICATION/COLLABORATION:   The patients plan of care was discussed with: Physical Therapist and Registered Nurse    CLEVELAND Youssef/L  Time Calculation: 30 mins

## 2018-12-11 NOTE — PROGRESS NOTES
Patient agitated this AM stated for RN to [facility] alone, get out. \"  Son in law present in room. Patient refusing meds and lab draws. Pulled PCT's hair when attempting to help patient off bedside commode. Dr. Lou Freeman aware and in with patient. Transfer orders noted.

## 2018-12-11 NOTE — PROGRESS NOTES
Problem: Pressure Injury - Risk of  Goal: *Prevention of pressure injury  Document Js Scale and appropriate interventions in the flowsheet. Outcome: Progressing Towards Goal  Pressure Injury Interventions: Activity Interventions: Increase time out of bed, Pressure redistribution bed/mattress(bed type), PT/OT evaluation    Mobility Interventions: Assess need for specialty bed, Float heels, Pressure redistribution bed/mattress (bed type), PT/OT evaluation, Turn and reposition approx.  every two hours(pillow and wedges)    Nutrition Interventions: Document food/fluid/supplement intake, Offer support with meals,snacks and hydration    Friction and Shear Interventions: Apply protective barrier, creams and emollients, Feet elevated on foot rest, Minimize layers

## 2018-12-11 NOTE — PROGRESS NOTES
David Wilkins [facility] [address] Saad Vazquez YOB: 1945 Assessment & Plan: 1. Hyponatremia 
- Na 121 to 127 mmol over 40 hrs and then dropped to 122 mmol 
- was on Hypotonic solution to slow the correction till yesterday am,not since theb - ? Drinking more 
- cont fluid restriction 
- labs now 
- may need 3% or lasix depending upon the results 2. Hypokalemia 
- replete and repeat 3. ? AMS Subjective:  
CC:HYPONATREMIA 
HPI: Patient seen Sodium dropped to 122 mmol AO3, Family at bedside. No CP/SON/N.V 
 ROS:Neg for 12 sys Current Facility-Administered Medications Medication Dose Route Frequency  hydrALAZINE (APRESOLINE) 20 mg/mL injection 10 mg  10 mg IntraVENous ONCE  hydrALAZINE (APRESOLINE) 20 mg/mL injection 20 mg  20 mg IntraVENous Q6H PRN  
 LORazepam (ATIVAN) injection 2 mg  2 mg IntraVENous Q1H PRN  
 LORazepam (ATIVAN) injection 4 mg  4 mg IntraVENous Q1H PRN  thiamine mononitrate (B-1) tablet 100 mg  100 mg Oral DAILY  therapeutic multivitamin (THERAGRAN) tablet 1 Tab  1 Tab Oral DAILY  folic acid (FOLVITE) tablet 1 mg  1 mg Oral DAILY  acetaminophen (TYLENOL) tablet 650 mg  650 mg Oral Q6H PRN  propranolol (INDERAL) tablet 40 mg  40 mg Oral BID And  
 propranolol (INDERAL) tablet 20 mg  20 mg Oral BID  traMADol (ULTRAM) tablet 50 mg  50 mg Oral Q6H PRN  
 sodium chloride (NS) flush 5-10 mL  5-10 mL IntraVENous Q8H  
 sodium chloride (NS) flush 5-10 mL  5-10 mL IntraVENous PRN  
 enoxaparin (LOVENOX) injection 40 mg  40 mg SubCUTAneous Q24H  ALPRAZolam (XANAX) tablet 0.25 mg  0.25 mg Oral BID PRN  
 atorvastatin (LIPITOR) tablet 40 mg  40 mg Oral DAILY  fluticasone (FLONASE) 50 mcg/actuation nasal spray 2 Spray  2 Spray Both Nostrils DAILY PRN  
 levothyroxine (SYNTHROID) tablet 100 mcg  100 mcg Oral Once per day on Sun Mon Tue Thu Fri  
  [START ON 2018] levothyroxine (SYNTHROID) tablet 50 mcg  50 mcg Oral Once per day on Wed Sat  loperamide (IMODIUM) capsule 2 mg  2 mg Oral QID PRN Objective:  
 
Vitals: 
Blood pressure (!) 192/99, pulse 68, temperature 97.9 °F (36.6 °C), resp. rate 18, height 5' 2\" (1.575 m), weight 50.8 kg (112 lb), SpO2 97 %. Temp (24hrs), Av.3 °F (36.8 °C), Min:97.7 °F (36.5 °C), Max:99 °F (37.2 °C) Intake and Output: 
No intake/output data recorded.  1901 -  0700 In: 2212.5 [P.O.:1820; I.V.:392.5] Out: 600 [Urine:600] Physical Exam:              
 Patient is intubated:  no 
 
Physical Examination:  
GENERAL ASSESSMENT: NAD HEENT:Nontraumatic CHEST: CTA HEART: S1S2 ABDOMEN: Soft,NT, 
:Reyna: n EXTREMITY: EDEMA n NEURO:Grossly non focal 
 
 
   
ECG/rhythm: 
 
Data Review No results for input(s): TNIPOC in the last 72 hours. No lab exists for component: ITNL Recent Labs 18 
1836 TROIQ <0.05 Recent Labs 18 
0701 12/10/18 
0343 12/10/18 
0249 18 
1050 18 
0110 18 
1836 * 127*  --  127* 125* 121*  
K 3.5 3.7  --  3.8 3.6 3.2*  
CL 89* 94*  --  93* 90* 85* CO2 25 25  --  26 24 26 BUN 5* 7  --  10 9 11 CREA 0.46* 0.50*  --  0.77 0.67 0.76 * 99  --  134* 141* 123* PHOS  --  2.6  --   --   --   --   
MG  --  1.9  --   --   --  2.2 CA 8.7 8.1*  --  8.1* 9.2 9.2 ALB  --   --   --   --   --  4.1 WBC 5.1  --  6.3  --  4.9 6.3 HGB 11.5  --  11.3*  --  12.4 11.8 HCT 32.9*  --  33.8*  --  34.4* 33.1*  
  --  382  --  372 346 No results for input(s): INR, PTP, APTT in the last 72 hours. No lab exists for component: INREXT, INREXT Needs: urine analysis, urine sodium, protein and creatinine Lab Results Component Value Date/Time Sodium,urine random 18 2018 04:40 PM  
 
 
 
Discussed with:  Nursing 
 
: Diana Malcolm MD 
2018 [facility]: [email or web address] office: 
[address] [place] [address], [address] [place], [address] [address] Phone: [phone] Fax :     [phone] [place] office: 
[address] [facility], [address] Phone - [phone] Fax - [phone]

## 2018-12-12 ENCOUNTER — APPOINTMENT (OUTPATIENT)
Dept: MRI IMAGING | Age: 73
DRG: 640 | End: 2018-12-12
Attending: PHYSICIAN ASSISTANT
Payer: MEDICARE

## 2018-12-12 LAB
ANION GAP SERPL CALC-SCNC: 11 MMOL/L (ref 5–15)
ANION GAP SERPL CALC-SCNC: 11 MMOL/L (ref 5–15)
ANION GAP SERPL CALC-SCNC: 13 MMOL/L (ref 5–15)
ANION GAP SERPL CALC-SCNC: 9 MMOL/L (ref 5–15)
BACTERIA SPEC CULT: NORMAL
BACTERIA SPEC CULT: NORMAL
BASOPHILS # BLD: 0 K/UL (ref 0–0.1)
BASOPHILS NFR BLD: 1 % (ref 0–1)
BUN SERPL-MCNC: 13 MG/DL (ref 6–20)
BUN SERPL-MCNC: 13 MG/DL (ref 6–20)
BUN SERPL-MCNC: 16 MG/DL (ref 6–20)
BUN SERPL-MCNC: 19 MG/DL (ref 6–20)
BUN/CREAT SERPL: 21 (ref 12–20)
BUN/CREAT SERPL: 22 (ref 12–20)
BUN/CREAT SERPL: 24 (ref 12–20)
BUN/CREAT SERPL: 35 (ref 12–20)
CALCIUM SERPL-MCNC: 8.5 MG/DL (ref 8.5–10.1)
CALCIUM SERPL-MCNC: 8.6 MG/DL (ref 8.5–10.1)
CALCIUM SERPL-MCNC: 8.7 MG/DL (ref 8.5–10.1)
CALCIUM SERPL-MCNC: 8.8 MG/DL (ref 8.5–10.1)
CHLORIDE SERPL-SCNC: 91 MMOL/L (ref 97–108)
CHLORIDE SERPL-SCNC: 92 MMOL/L (ref 97–108)
CHLORIDE SERPL-SCNC: 94 MMOL/L (ref 97–108)
CHLORIDE SERPL-SCNC: 97 MMOL/L (ref 97–108)
CO2 SERPL-SCNC: 22 MMOL/L (ref 21–32)
CO2 SERPL-SCNC: 23 MMOL/L (ref 21–32)
CO2 SERPL-SCNC: 24 MMOL/L (ref 21–32)
CO2 SERPL-SCNC: 24 MMOL/L (ref 21–32)
CREAT SERPL-MCNC: 0.55 MG/DL (ref 0.55–1.02)
CREAT SERPL-MCNC: 0.6 MG/DL (ref 0.55–1.02)
CREAT SERPL-MCNC: 0.62 MG/DL (ref 0.55–1.02)
CREAT SERPL-MCNC: 0.68 MG/DL (ref 0.55–1.02)
DIFFERENTIAL METHOD BLD: ABNORMAL
EOSINOPHIL # BLD: 0.1 K/UL (ref 0–0.4)
EOSINOPHIL NFR BLD: 1 % (ref 0–7)
ERYTHROCYTE [DISTWIDTH] IN BLOOD BY AUTOMATED COUNT: 12.1 % (ref 11.5–14.5)
GLUCOSE SERPL-MCNC: 100 MG/DL (ref 65–100)
GLUCOSE SERPL-MCNC: 108 MG/DL (ref 65–100)
GLUCOSE SERPL-MCNC: 99 MG/DL (ref 65–100)
GLUCOSE SERPL-MCNC: 99 MG/DL (ref 65–100)
HCT VFR BLD AUTO: 31.7 % (ref 35–47)
HGB BLD-MCNC: 10.9 G/DL (ref 11.5–16)
IMM GRANULOCYTES # BLD: 0 K/UL (ref 0–0.04)
IMM GRANULOCYTES NFR BLD AUTO: 0 % (ref 0–0.5)
LYMPHOCYTES # BLD: 1.6 K/UL (ref 0.8–3.5)
LYMPHOCYTES NFR BLD: 26 % (ref 12–49)
MAGNESIUM SERPL-MCNC: 2 MG/DL (ref 1.6–2.4)
MCH RBC QN AUTO: 30.6 PG (ref 26–34)
MCHC RBC AUTO-ENTMCNC: 34.4 G/DL (ref 30–36.5)
MCV RBC AUTO: 89 FL (ref 80–99)
MONOCYTES # BLD: 0.6 K/UL (ref 0–1)
MONOCYTES NFR BLD: 10 % (ref 5–13)
NEUTS SEG # BLD: 3.8 K/UL (ref 1.8–8)
NEUTS SEG NFR BLD: 62 % (ref 32–75)
NRBC # BLD: 0 K/UL (ref 0–0.01)
NRBC BLD-RTO: 0 PER 100 WBC
OSMOLALITY UR: 457 MOSM/KG H2O
PLATELET # BLD AUTO: 361 K/UL (ref 150–400)
PMV BLD AUTO: 9 FL (ref 8.9–12.9)
POTASSIUM SERPL-SCNC: 3.3 MMOL/L (ref 3.5–5.1)
POTASSIUM SERPL-SCNC: 3.4 MMOL/L (ref 3.5–5.1)
POTASSIUM SERPL-SCNC: 3.5 MMOL/L (ref 3.5–5.1)
POTASSIUM SERPL-SCNC: 4.6 MMOL/L (ref 3.5–5.1)
RBC # BLD AUTO: 3.56 M/UL (ref 3.8–5.2)
SERVICE CMNT-IMP: NORMAL
SODIUM SERPL-SCNC: 126 MMOL/L (ref 136–145)
SODIUM SERPL-SCNC: 126 MMOL/L (ref 136–145)
SODIUM SERPL-SCNC: 129 MMOL/L (ref 136–145)
SODIUM SERPL-SCNC: 130 MMOL/L (ref 136–145)
SODIUM UR-SCNC: 48 MMOL/L
WBC # BLD AUTO: 6.2 K/UL (ref 3.6–11)

## 2018-12-12 PROCEDURE — 72148 MRI LUMBAR SPINE W/O DYE: CPT

## 2018-12-12 PROCEDURE — 97116 GAIT TRAINING THERAPY: CPT

## 2018-12-12 PROCEDURE — 83735 ASSAY OF MAGNESIUM: CPT

## 2018-12-12 PROCEDURE — 85025 COMPLETE CBC W/AUTO DIFF WBC: CPT

## 2018-12-12 PROCEDURE — 84300 ASSAY OF URINE SODIUM: CPT

## 2018-12-12 PROCEDURE — 80048 BASIC METABOLIC PNL TOTAL CA: CPT

## 2018-12-12 PROCEDURE — 74011250637 HC RX REV CODE- 250/637: Performed by: INTERNAL MEDICINE

## 2018-12-12 PROCEDURE — 83935 ASSAY OF URINE OSMOLALITY: CPT

## 2018-12-12 PROCEDURE — 97535 SELF CARE MNGMENT TRAINING: CPT

## 2018-12-12 PROCEDURE — 36415 COLL VENOUS BLD VENIPUNCTURE: CPT

## 2018-12-12 PROCEDURE — 97530 THERAPEUTIC ACTIVITIES: CPT

## 2018-12-12 PROCEDURE — 72146 MRI CHEST SPINE W/O DYE: CPT

## 2018-12-12 PROCEDURE — 65660000000 HC RM CCU STEPDOWN

## 2018-12-12 PROCEDURE — 74011250636 HC RX REV CODE- 250/636: Performed by: INTERNAL MEDICINE

## 2018-12-12 RX ORDER — POTASSIUM CHLORIDE 1.5 G/1.77G
40 POWDER, FOR SOLUTION ORAL ONCE
Status: COMPLETED | OUTPATIENT
Start: 2018-12-12 | End: 2018-12-12

## 2018-12-12 RX ORDER — POTASSIUM CHLORIDE 750 MG/1
40 TABLET, FILM COATED, EXTENDED RELEASE ORAL
Status: DISCONTINUED | OUTPATIENT
Start: 2018-12-12 | End: 2018-12-12

## 2018-12-12 RX ORDER — TOLVAPTAN 15 MG/1
15 TABLET ORAL ONCE
Status: COMPLETED | OUTPATIENT
Start: 2018-12-12 | End: 2018-12-12

## 2018-12-12 RX ADMIN — LOPERAMIDE HYDROCHLORIDE 2 MG: 2 CAPSULE ORAL at 22:59

## 2018-12-12 RX ADMIN — POTASSIUM CHLORIDE 40 MEQ: 1.5 POWDER, FOR SOLUTION ORAL at 09:18

## 2018-12-12 RX ADMIN — Medication 10 ML: at 23:03

## 2018-12-12 RX ADMIN — PROPRANOLOL HYDROCHLORIDE 40 MG: 10 TABLET ORAL at 23:00

## 2018-12-12 RX ADMIN — Medication 10 ML: at 15:06

## 2018-12-12 RX ADMIN — ENOXAPARIN SODIUM 40 MG: 40 INJECTION SUBCUTANEOUS at 22:59

## 2018-12-12 RX ADMIN — ALPRAZOLAM 0.25 MG: 0.25 TABLET ORAL at 22:58

## 2018-12-12 RX ADMIN — Medication 10 ML: at 06:00

## 2018-12-12 RX ADMIN — ACETAMINOPHEN 650 MG: 325 TABLET, FILM COATED ORAL at 06:29

## 2018-12-12 RX ADMIN — TOLVAPTAN 15 MG: 15 TABLET ORAL at 10:23

## 2018-12-12 RX ADMIN — THERA TABS 1 TABLET: TAB at 08:09

## 2018-12-12 RX ADMIN — LEVOTHYROXINE SODIUM 50 MCG: 50 TABLET ORAL at 08:09

## 2018-12-12 RX ADMIN — ALPRAZOLAM 0.25 MG: 0.25 TABLET ORAL at 15:03

## 2018-12-12 RX ADMIN — PROPRANOLOL HYDROCHLORIDE 20 MG: 10 TABLET ORAL at 22:58

## 2018-12-12 RX ADMIN — ACETAMINOPHEN 650 MG: 325 TABLET, FILM COATED ORAL at 15:03

## 2018-12-12 RX ADMIN — PROPRANOLOL HYDROCHLORIDE 20 MG: 10 TABLET ORAL at 08:10

## 2018-12-12 RX ADMIN — PROPRANOLOL HYDROCHLORIDE 40 MG: 10 TABLET ORAL at 08:09

## 2018-12-12 RX ADMIN — FOLIC ACID 1 MG: 1 TABLET ORAL at 08:10

## 2018-12-12 RX ADMIN — TRAMADOL HYDROCHLORIDE 50 MG: 50 TABLET, FILM COATED ORAL at 09:19

## 2018-12-12 RX ADMIN — Medication 100 MG: at 08:10

## 2018-12-12 RX ADMIN — Medication 10 ML: at 15:07

## 2018-12-12 RX ADMIN — ATORVASTATIN CALCIUM 40 MG: 10 TABLET, FILM COATED ORAL at 08:09

## 2018-12-12 NOTE — PROGRESS NOTES
David Robertoelsen [facility] [address]  [address] [facility], [address] [place] [address]  [phone]      Medical Progress Note      NAME: Marlena Davenport   :  1945  MRM:  [id number]    Date/Time: 2018        Subjective:     Chief Complaint: \"I'm good\"     Pt seen and examined. Mentation much improved. Objective:       Vitals:        Last 24hrs VS reviewed since prior progress note. Most recent are:    Visit Vitals  /90 (BP 1 Location: Left arm, BP Patient Position: At rest)   Pulse 62   Temp 99 °F (37.2 °C)   Resp 28   Ht 5' 2\" (1.575 m)   Wt 52.2 kg (115 lb 1.3 oz)   SpO2 99%   BMI 21.05 kg/m²     SpO2 Readings from Last 6 Encounters:   18 99%   14 100%   12 100%            Intake/Output Summary (Last 24 hours) at 2018 1246  Last data filed at 2018 1026  Gross per 24 hour   Intake 611.67 ml   Output 1100 ml   Net -488. 33 ml          Exam:     Physical Exam:    Gen: Awake. Follows commands  HEENT:  Sclerae nonicteric, hearing intact to voice, mucous membranes moist  Neck:  Supple, without masses. Resp:  No accessory muscle use, CTAB without wheezes, rales, or rhonchi  Card: RRR, without m/r/g. No LE edema. Abd:  +bowel sounds, soft, NTTP, nondistended. No HSM. Neuro: CN 2-10 intact. Good strength bilaterally in the UE and LE   Psych:  Moderate insight      Medications Reviewed: (see below)    Lab Data Reviewed: (see below)    ______________________________________________________________________    Medications:     Current Facility-Administered Medications   Medication Dose Route Frequency    hydrALAZINE (APRESOLINE) 20 mg/mL injection 20 mg  20 mg IntraVENous Q6H PRN    LORazepam (ATIVAN) injection 1 mg  1 mg IntraVENous Q1H PRN    LORazepam (ATIVAN) injection 2 mg  2 mg IntraVENous Q1H PRN    sodium chloride (NS) flush 5-10 mL  5-10 mL IntraVENous Q8H    sodium chloride (NS) flush 5-10 mL  5-10 mL IntraVENous PRN    thiamine mononitrate (B-1) tablet 100 mg  100 mg Oral DAILY    therapeutic multivitamin (THERAGRAN) tablet 1 Tab  1 Tab Oral DAILY    folic acid (FOLVITE) tablet 1 mg  1 mg Oral DAILY    acetaminophen (TYLENOL) tablet 650 mg  650 mg Oral Q6H PRN    propranolol (INDERAL) tablet 40 mg  40 mg Oral BID    And    propranolol (INDERAL) tablet 20 mg  20 mg Oral BID    traMADol (ULTRAM) tablet 50 mg  50 mg Oral Q6H PRN    sodium chloride (NS) flush 5-10 mL  5-10 mL IntraVENous Q8H    sodium chloride (NS) flush 5-10 mL  5-10 mL IntraVENous PRN    enoxaparin (LOVENOX) injection 40 mg  40 mg SubCUTAneous Q24H    ALPRAZolam (XANAX) tablet 0.25 mg  0.25 mg Oral BID PRN    atorvastatin (LIPITOR) tablet 40 mg  40 mg Oral DAILY    fluticasone (FLONASE) 50 mcg/actuation nasal spray 2 Spray  2 Spray Both Nostrils DAILY PRN    levothyroxine (SYNTHROID) tablet 100 mcg  100 mcg Oral Once per day on Sun Mon Tue Thu Fri    levothyroxine (SYNTHROID) tablet 50 mcg  50 mcg Oral Once per day on Wed Sat    loperamide (IMODIUM) capsule 2 mg  2 mg Oral QID PRN            Lab Review:     Recent Labs     12/12/18  0348 12/11/18  0701 12/10/18  0249   WBC 6.2 5.1 6.3   HGB 10.9* 11.5 11.3*   HCT 31.7* 32.9* 33.8*    382 382     Recent Labs     12/12/18  0749 12/12/18  0348 12/12/18  0000  12/10/18  0343   * 129* 126*   < > 127*   K 3.4* 3.3* 3.5   < > 3.7   CL 92* 94* 91*   < > 94*   CO2 23 24 22   < > 25   * 99 100   < > 99   BUN 13 13 16   < > 7   CREA 0.60 0.62 0.68   < > 0.50*   CA 8.7 8.5 8.8   < > 8.1*   MG  --  2.0  --   --  1.9   PHOS  --   --   --   --  2.6    < > = values in this interval not displayed. No components found for: GLPOC  No results for input(s): PH, PCO2, PO2, HCO3, FIO2 in the last 72 hours. No results for input(s): INR in the last 72 hours.     No lab exists for component: Tip Zurita  Lab Results   Component Value Date/Time    Specimen Description: URINE 07/29/2009 05:29 PM     Lab Results   Component Value Date/Time    Culture result: MRSA NOT PRESENT  AT 14 HOURS 12/11/2018 11:31 AM    Culture result: ESCHERICHIA COLI 07/29/2009 05:29 PM            Assessment / Plan:   Metabolic encephalopathy in the setting of hyponatremia with confusion and falls requiring IVF, lab monitoring, CT head, PT/OT, fall precautions and Neurology consult  -suspect likely multifactorial. Possibly 2/2 alcohol use/abuse, hyponatremia and meds   -neurology on board; appreciate assistance   -head CT without acute process  -CT C and T spine without acute neurologic findings other than T10 and T11 fractures (see below)   -treat hyponatremia; see below      Hyponatremia: acute on chronic. 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 Head CT showed no acute process. 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 No diagnosis of Parkinson's  -PT/OT eval   -likely d/c to IPR   -suspect alcohol use may be contributing to gait instability   -neurology on board; appreciate assistance      Confusion/short term memory loss - suspect 2/2 dementia, meds, alcohol use   -will need neuropsych eval as an outpt   -B12, folate WNL  -thiamine pending      Hyperlipidemia:   -statin     Acquired hypothyroidism (): TSH WNR  -continue LT4     Raghavendra tremors:   -continue propranolol, deep brain stimulator implanted as above     Anxiety/ depression: Xanax PRN would NOT be a good long-term choice for her due to age, alcohol use and gait instability, and her SSRI is held due to possible SIADH  -psych eval appreciated; will begin xanax taper in the next few days      T10/T11 fractures   -TLSO brace   -PT/OT eval  -ortho on board; MRI pending to further eval      Chronic diarrhea: reportedly had full workup with her PCP and according to pt and daughter. Would follow up outpatient.  On imodium.      Alcohol abuse - suspect severe   -CIWA protocol   -thiamine, folic acid, MVI     Total time spent: 35 minutes                    Care Plan discussed with: Patient, Nursing Staff and >50% of time spent in counseling and coordination of care    Discussed:  Care Plan and D/C Planning    Prophylaxis:  Lovenox    Disposition:  TBD         ___________________________________________________    Attending Physician: Malik Valencia MD

## 2018-12-12 NOTE — PROGRESS NOTES
1900: Bedside and Verbal shift change report given to Jeimy Marcum RN (oncoming nurse) by Bailey Aguilar RN (offgoing nurse). Report included the following information SBAR, Kardex, Procedure Summary, Intake/Output, MAR, Recent Results, Med Rec Status and Cardiac Rhythm NSR .       2000: Patient AxOx4, resting in bed with family at bedside. 3% infusing at 25mL/hr. No complaints at this time. Call bell within reach. 0000: No new changes, patient resting in bed. Lab work sent to lab, awaiting results. 0110: Patients Na 126, order to call if <124 or >130. Will recheck in 4 hours. 0430: Patients lab resulted, Na 129, will continue to monitor. 0700: Bedside and Verbal shift change report given to [address] [place] [address] (oncoming nurse) by Jeimy Marcum RN (offgoing nurse). Report included the following information SBAR, Kardex, Procedure Summary, Intake/Output, MAR, Recent Results, Med Rec Status and Cardiac Rhythm NSR .

## 2018-12-12 NOTE — PROGRESS NOTES
Problem: Falls - Risk of  Goal: *Absence of Falls  Document Vanesa Fall Risk and appropriate interventions in the flowsheet. Outcome: Progressing Towards Goal  Fall Risk Interventions:  Mobility Interventions: Bed/chair exit alarm, Communicate number of staff needed for ambulation/transfer, Patient to call before getting OOB, PT Consult for assist device competence, PT Consult for mobility concerns, Utilize gait belt for transfers/ambulation, Utilize walker, cane, or other assistive device, Strengthening exercises (ROM-active/passive)    Mentation Interventions: Bed/chair exit alarm, Adequate sleep, hydration, pain control, Door open when patient unattended, Increase mobility, More frequent rounding, Reorient patient, Room close to nurse's station, Evaluate medications/consider consulting pharmacy    Medication Interventions: Teach patient to arise slowly, Patient to call before getting OOB, Bed/chair exit alarm, Evaluate medications/consider consulting pharmacy, Utilize gait belt for transfers/ambulation    Elimination Interventions: Call light in reach, Bed/chair exit alarm, Toileting schedule/hourly rounds, Patient to call for help with toileting needs    History of Falls Interventions: Bed/chair exit alarm, Consult care management for discharge planning, Door open when patient unattended, Room close to nurse's station, Evaluate medications/consider consulting pharmacy, Investigate reason for fall, Utilize gait belt for transfer/ambulation        Problem: Pressure Injury - Risk of  Goal: *Prevention of pressure injury  Document Js Scale and appropriate interventions in the flowsheet. Outcome: Progressing Towards Goal  Pressure Injury Interventions:             Activity Interventions: PT/OT evaluation, Pressure redistribution bed/mattress(bed type), Increase time out of bed    Mobility Interventions: PT/OT evaluation, Pressure redistribution bed/mattress (bed type), HOB 30 degrees or less, Turn and reposition approx.  every two hours(pillow and wedges)    Nutrition Interventions: Document food/fluid/supplement intake, Discuss nutritional consult with provider, Offer support with meals,snacks and hydration    Friction and Shear Interventions: HOB 30 degrees or less, Lift sheet, Apply protective barrier, creams and emollients, Lift team/patient mobility team, Minimize layers

## 2018-12-12 NOTE — PROGRESS NOTES
Problem: Self Care Deficits Care Plan (Adult)  Goal: *Acute Goals and Plan of Care (Insert Text)  Occupational Therapy Goals  Initiated 12/10/2018    1. Patient will perform lower body dressing with supervision/set-up using AE PRN within 7 days. 2.  Patient will perform toilet transfers with supervision/set-up using most appropriate DME within 7 days. 3.  Patient will perform grooming, standing at sink, at supervision/set-up within 7 days. 4.  Patient will don/doff back brace at supervision/set-up within 7 days. 5.  Patient will verbalize/demonstrate 3/3 back precautions during ADL tasks without cues within 7 days. Occupational Therapy TREATMENT  Patient: Shana Gutierrez (20 y.o. female)  Date: 12/12/2018  Diagnosis: Hyponatremia  Hyponatremia  Hyponatremia Hyponatremia      Precautions: Back  Chart, occupational therapy assessment, plan of care, and goals were reviewed. ASSESSMENT:  Pt seated in chair, agreeable to OT. She was able to state 2/3 back precautions and verbal cueing for third. Pt stood with contact guard, ambulated to bathroom and transferred to commode with contact guard. Verbal cueing for safety having walker in front of her at sink to wash her hands. She demonstrated crossing her legs to doff/don socks with stand by assist. Educated her as to use of reacher for self care tasks. She stated she has one in the attic. Recommend rehab. Progression toward goals:  [x]       Improving appropriately and progressing toward goals  []       Improving slowly and progressing toward goals  []       Not making progress toward goals and plan of care will be adjusted     PLAN:  Patient continues to benefit from skilled intervention to address the above impairments. Continue treatment per established plan of care. Discharge Recommendations:Rehab  Further Equipment Recommendations for Discharge:  None     SUBJECTIVE:   Patient stated Thank you.     OBJECTIVE DATA SUMMARY:   Cognitive/Behavioral Status:  Neurologic State: Alert  Orientation Level: Oriented X4  Cognition: Appropriate decision making; Appropriate for age attention/concentration; Appropriate safety awareness             Functional Mobility and Transfers for ADLs:  Bed Mobility:  Rolling: Contact guard assistance  Supine to Sit: Contact guard assistance  Sit to Supine: Contact guard assistance  Scooting: Contact guard assistance    Transfers:  Sit to Stand: Contact guard assistance  Functional Transfers  Toilet Transfer : Contact guard assistance  Bed to Chair: Contact guard assistance    Balance:  Sitting: Intact  Standing: Impaired; With support  Standing - Static: Fair  Standing - Dynamic : Fair    ADL Intervention:       Grooming  Washing Hands: Contact guard assistance                   Lower Body Dressing Assistance  Socks: Stand-by assistance  Leg Crossed Method Used: Yes  Position Performed: Seated in chair     Pain:  Pain Scale 1: Numeric (0 - 10)  Pain Intensity 1: 0              Activity Tolerance:   Fair  Please refer to the flowsheet for vital signs taken during this treatment.   After treatment:   [x] Patient left in no apparent distress sitting up in chair  [] Patient left in no apparent distress in bed  [x] Call bell left within reach  [] Nursing notified  [] Caregiver present  [] Bed alarm activated    COMMUNICATION/COLLABORATION:   The patients plan of care was discussed with: Occupational Therapist    GEORGIA Ashton  Time Calculation: 15 mins

## 2018-12-12 NOTE — PROGRESS NOTES
TRANSFER - IN REPORT:    Verbal report received from [employer] (name) on Saad DESTINEE Vazquez  being received from ICU (unit) for routine progression of care      Report consisted of patients Situation, Background, Assessment and   Recommendations(SBAR). Information from the following report(s) SBAR, Kardex, Procedure Summary, Intake/Output, MAR, Recent Results and Med Rec Status was reviewed with the receiving nurse. Opportunity for questions and clarification was provided. Assessment completed upon patients arrival to unit and care assumed. Primary Nurse Anastasiya Townsend RN and Sophia Babinski, RN performed a dual skin assessment on this patient No impairment noted, only bruises and under breast redness, Js score is 19.    1530 Called phlebotomist for blood drawn. 1700 Blood drawn and sent to lab.

## 2018-12-12 NOTE — PROGRESS NOTES
Problem: Falls - Risk of  Goal: *Absence of Falls  Document Vanesa Fall Risk and appropriate interventions in the flowsheet. Outcome: Progressing Towards Goal  Fall Risk Interventions:  Mobility Interventions: Bed/chair exit alarm, Patient to call before getting OOB    Mentation Interventions: Bed/chair exit alarm    Medication Interventions: Patient to call before getting OOB    Elimination Interventions: Elevated toilet seat, Patient to call for help with toileting needs    History of Falls Interventions:  Investigate reason for fall

## 2018-12-12 NOTE — PROGRESS NOTES
Reason for Admission:   Rylee Nip @ home,hyponatremia,confusion/short-term memory loss                   RRAT Score:          5           Plan for utilizing home health: The current plans are for rehab either @ inpatient rehab or SNF. Likelihood of Readmission:  Low/green                         Transition of Care Plan:                    I met with pt and her son-in-law,Jared Caballero. Jared's cell number is [phone]. Sylvia Pastures and pt.'s daughter reside in [place]. Pt confirmed her address as correct on demographics. Pt lives alone . She has another son,Sumeet who lives in [place]. Sumeet's number is [phone]. Pt has [employer] as her secondary insurance. I discussed the recommendations of PT and OT with pt and her son-in-law. Pt.'s [facility] choice for [facility]. Physiatry consult placed so physiatrist can also evaluate pt for inpatient rehab. Pt.'s first choice for SNF placement is [facility]. With pt.'s consent,clinicals includng chest xray snd inpatient order faxed to both [facility] and to [facility]. I will continue to follow pt for discharge needs.   Charissa Rodriguez

## 2018-12-12 NOTE — PROGRESS NOTES
David Wilkins [facility] 79    Saad DESTINEE George  YOB: 1945          Assessment & Plan:   1. Hyponatremia  - Na 123- to 126-129 mmol with 3%  - no more 3%  - Trial of tolvaptan  - repeat urine studies never collected  - discont fluid restriction for today while on Tolvaptan  - labs q 8     2. Hypokalemia  - replete and repeat  3. ? AMS       Subjective:   CC:HYPONATREMIA  HPI: Patient seen   Sodium 123 to 126 mmol: s/p 3%  AO3, Family at bedside.   No CP/SON/N.V   ROS:Neg for 12 sys  Current Facility-Administered Medications   Medication Dose Route Frequency    potassium chloride (KLOR-CON) packet 40 mEq  40 mEq Oral ONCE    hydrALAZINE (APRESOLINE) 20 mg/mL injection 20 mg  20 mg IntraVENous Q6H PRN    LORazepam (ATIVAN) injection 1 mg  1 mg IntraVENous Q1H PRN    LORazepam (ATIVAN) injection 2 mg  2 mg IntraVENous Q1H PRN    sodium chloride (NS) flush 5-10 mL  5-10 mL IntraVENous Q8H    sodium chloride (NS) flush 5-10 mL  5-10 mL IntraVENous PRN    thiamine mononitrate (B-1) tablet 100 mg  100 mg Oral DAILY    therapeutic multivitamin (THERAGRAN) tablet 1 Tab  1 Tab Oral DAILY    folic acid (FOLVITE) tablet 1 mg  1 mg Oral DAILY    acetaminophen (TYLENOL) tablet 650 mg  650 mg Oral Q6H PRN    propranolol (INDERAL) tablet 40 mg  40 mg Oral BID    And    propranolol (INDERAL) tablet 20 mg  20 mg Oral BID    traMADol (ULTRAM) tablet 50 mg  50 mg Oral Q6H PRN    sodium chloride (NS) flush 5-10 mL  5-10 mL IntraVENous Q8H    sodium chloride (NS) flush 5-10 mL  5-10 mL IntraVENous PRN    enoxaparin (LOVENOX) injection 40 mg  40 mg SubCUTAneous Q24H    ALPRAZolam (XANAX) tablet 0.25 mg  0.25 mg Oral BID PRN    atorvastatin (LIPITOR) tablet 40 mg  40 mg Oral DAILY    fluticasone (FLONASE) 50 mcg/actuation nasal spray 2 Spray  2 Spray Both Nostrils DAILY PRN    levothyroxine (SYNTHROID) tablet 100 mcg  100 mcg Oral Once per day on Sun Mon Tue Thu Fri    levothyroxine (SYNTHROID) tablet 50 mcg  50 mcg Oral Once per day on Wed Sat    loperamide (IMODIUM) capsule 2 mg  2 mg Oral QID PRN          Objective:     Vitals:  Blood pressure (!) 159/110, pulse 65, temperature 98.4 °F (36.9 °C), resp. rate 18, height 5' 2\" (1.575 m), weight 52.2 kg (115 lb 1.3 oz), SpO2 99 %. Temp (24hrs), Av.2 °F (36.8 °C), Min:97.7 °F (36.5 °C), Max:98.8 °F (37.1 °C)      Intake and Output:  No intake/output data recorded. 12/10 1901 -  0700  In: 911.7 [P.O.:800; I.V.:111.7]  Out: 1600 [Urine:1600]    Physical Exam:                Patient is intubated:  no    Physical Examination:   GENERAL ASSESSMENT: NAD  HEENT:Nontraumatic   CHEST: CTA  HEART: S1S2  ABDOMEN: Soft,NT,  :Reyna: n  EXTREMITY: EDEMA n  NEURO:Grossly non focal          ECG/rhythm:    Data Review      No results for input(s): TNIPOC in the last 72 hours. No lab exists for component: ITNL   No results for input(s): CPK, CKMB, TROIQ in the last 72 hours. Recent Labs     18  0749 18  0348 18  0000  18  0701 12/10/18  0343 12/10/18  0249   * 129* 126*   < > 122* 127*  --    K 3.4* 3.3* 3.5   < > 3.5 3.7  --    CL 92* 94* 91*   < > 89* 94*  --    CO2 23 24 22   < > 25 25  --    BUN 13 13 16   < > 5* 7  --    CREA 0.60 0.62 0.68   < > 0.46* 0.50*  --    * 99 100   < > 114* 99  --    PHOS  --   --   --   --   --  2.6  --    MG  --  2.0  --   --   --  1.9  --    CA 8.7 8.5 8.8   < > 8.7 8.1*  --    WBC  --  6.2  --   --  5.1  --  6.3   HGB  --  10.9*  --   --  11.5  --  11.3*   HCT  --  31.7*  --   --  32.9*  --  33.8*   PLT  --  361  --   --  382  --  382    < > = values in this interval not displayed. No results for input(s): INR, PTP, APTT in the last 72 hours.     No lab exists for component: INREXT, INREXT  Needs: urine analysis, urine sodium, protein and creatinine  Lab Results   Component Value Date/Time    Sodium,urine random 18 2018 04:40 PM         Discussed with:  Nursing    : Jenny Christensen MD  12/12/2018        [facility]:  [email or web address]  Iliana Cormier office:  [address], [address] [place] [address],[address]  [place], [address] [place] [address]  Phone: [phone]  Fax :     [phone]    [place] office:  [address] [facility], [address] [place] [address] [address]  Phone - [phone]  Fax - [phone]

## 2018-12-12 NOTE — PROGRESS NOTES
Problem: Mobility Impaired (Adult and Pediatric)  Goal: *Acute Goals and Plan of Care (Insert Text)  Physical Therapy Goals  Initiated 12/10/2018    1. Patient will move from supine to sit and sit to supine , scoot up and down and roll side to side in bed with supervision/set-up within 4 days. 2. Patient will perform sit to stand with supervision/set-up within 4 days. 3. Patient will ambulate with supervision/set-up for 200 feet with the least restrictive device within 4 days. 4. Patient will ascend/descend 2 stairs with 1 handrail(s) with minimal assistance/contact guard assist within 4 days. 5. Patient will verbalize and demonstrate understanding of spinal precautions (No bending, lifting greater than 5 lbs, or twisting; log-roll technique; frequent repositioning as instructed) within 4 days. physical Therapy TREATMENT  Patient: Waqas Steve (89 y.o. female)  Date: 12/12/2018  Diagnosis: Hyponatremia  Hyponatremia  Hyponatremia Hyponatremia      Precautions: Back  Chart, physical therapy assessment, plan of care and goals were reviewed. ASSESSMENT:  Based on the objective data described below, patient participated well with treatment today. Rolled on the edge of bed CGA, Sit on the edge of bed CGA, ambulate with rolling walker CGA in the room and around the bed slow pace gait. Reviewed back precautions and donning of TLSO with the patient verbalized understanding. OOB to chair as tolerated performed some active range of motion exercise on both LE all planes. Ortho PA in the room during therapy and re enforced with the patient of wearing the TLSO while OOB to chair. Notified nurse who agreed to monitor and assist back to bed when ready to go back.      Progression toward goals:  [x]    Improving appropriately and progressing toward goals  []    Improving slowly and progressing toward goals  []    Not making progress toward goals and plan of care will be adjusted     PLAN:  Patient continues to benefit from skilled intervention to address the above impairments. Continue treatment per established plan of care. Discharge Recommendations:  Home Health To Be Determined pending progress from therapy. Further Equipment Recommendations for Discharge:  TBD     SUBJECTIVE:   Patient stated Do I have to wear this when I sleep.     OBJECTIVE DATA SUMMARY:   Critical Behavior:  Neurologic State: Alert  Orientation Level: Oriented X4  Cognition: Appropriate decision making, Appropriate for age attention/concentration, Appropriate safety awareness  Safety/Judgement: Decreased insight into deficits, Decreased awareness of need for safety, Decreased awareness of need for assistance  Functional Mobility Training:  Bed Mobility:  Rolling: Contact guard assistance  Supine to Sit: Contact guard assistance  Sit to Supine: Contact guard assistance  Scooting: Contact guard assistance        Transfers:  Sit to Stand: Contact guard assistance  Stand to Sit: Contact guard assistance  Stand Pivot Transfers: Contact guard assistance     Bed to Chair: Contact guard assistance                    Balance:  Sitting: Intact  Standing: Impaired; With support  Standing - Static: Fair  Standing - Dynamic : Fair  Ambulation/Gait Training:  Distance (ft): 30 Feet (ft)  Assistive Device: Walker, rolling;Gait belt;Brace/Splint  Ambulation - Level of Assistance: Contact guard assistance     Gait Description (WDL): Exceptions to WDL  Gait Abnormalities: Path deviations; Step to gait        Base of Support: Narrowed     Speed/Hellen: Delayed;Pace decreased (<100 feet/min)  Step Length: Right shortened;Left shortened       Therapeutic Exercises:    Instructed patient to continue active range of motion exercise on both legs while up on chair or on bed. Pain:  Pain Scale 1: Numeric (0 - 10)  Pain Intensity 1: 0               Activity Tolerance:   Good. Please refer to the flowsheet for vital signs taken during this treatment.   After treatment:   [x] Patient left in no apparent distress sitting up in chair  []    Patient left in no apparent distress in bed  [x]    Call bell left within reach  [x]    Nursing notified  []    Caregiver present  []    Bed alarm activated    COMMUNICATION/COLLABORATION:   The patients plan of care was discussed with: Registered Nurse and patient    Cedric Sehikh PT,WCC.    Time Calculation: 24 mins

## 2018-12-12 NOTE — ROUTINE PROCESS
TRANSFER - OUT REPORT:    Verbal report given to Samantha(name) on Saad E George  being transferred to PCU(unit) for routine progression of care       Report consisted of patients Situation, Background, Assessment and   Recommendations(SBAR). Information from the following report(s) SBAR, Kardex, Intake/Output, MAR, Recent Results and Cardiac Rhythm NSR was reviewed with the receiving nurse. Lines:   Peripheral IV 12/11/18 Left Arm (Active)   Site Assessment Clean, dry, & intact 12/12/2018  8:00 AM   Phlebitis Assessment 0 12/12/2018  8:00 AM   Infiltration Assessment 0 12/12/2018  8:00 AM   Dressing Status Clean, dry, & intact 12/12/2018  8:00 AM   Dressing Type Tape;Transparent 12/12/2018  8:00 AM   Hub Color/Line Status Pink;Capped;Flushed 12/12/2018  8:00 AM   Action Taken Open ports on tubing capped 12/12/2018  8:00 AM   Alcohol Cap Used Yes 12/12/2018  8:00 AM       Peripheral IV 12/12/18 Left Antecubital (Active)   Site Assessment Clean, dry, & intact 12/12/2018  8:00 AM   Phlebitis Assessment 0 12/12/2018  8:00 AM   Infiltration Assessment 0 12/12/2018  8:00 AM   Dressing Status Clean, dry, & intact 12/12/2018  8:00 AM   Dressing Type Tape;Transparent 12/12/2018  8:00 AM   Hub Color/Line Status Pink;Capped;Flushed 12/12/2018  8:00 AM   Action Taken Open ports on tubing capped 12/12/2018  8:00 AM   Alcohol Cap Used Yes 12/12/2018  8:00 AM        Opportunity for questions and clarification was provided.       Patient transported with:   Monitor  Registered Nurse  Tech

## 2018-12-12 NOTE — CONSULTS
Consult Date: 12/12/2018    IP CONSULT TO PHYSIATRIST(REHAB MEDICINE)  Consult performed by: Peter Riley MD  Consult ordered by: Hank Bruner MD  Reason for consult: assess rehab needs  Assessment/Recommendations: 67 yo woman with hx of severe familiar tremor, decline in short-term memory and cognition, falls, more recent confusion found to have severe hyponatremia and dx multifactorial metabolic encephalopathy. -- would like to accept for short IPR stay to optimize functional status prior to discharge home, to better ascertain what home support she needs, and allow family to arrange for safe discharge beyond the short term    Noted PT progression today at [address], however given her history I do not think that this fully captures her risk for repeated falls and further decline. A short IPR stay to monitor recovery from hyponatremia, optimize medical factors that could help to slow or halt her gradual decline, and intensive work with IP therapists to optimize her functional status prior to discharge would be beneficial.           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 Lives alone with family living in area. Discussion w/pt and son-in-law regarding level of function over the past year, had been getting along OK but getting impression of gradually increasing difficulty with keeping up at home, keeping medications straight, overall level of independent function. Discussed need for more help at home and possible need for family or caretaker assistance. Family planning meeting soon to determine strategy for her getting more help at home. Pt notes vast improvement in tremor after DBS implanted.   Has taken propranolol for years and not sure how much it helps. Takes xanax at night for sleep. Of note has T10-11 compression fractures. Subjective   Feeling better, thinks that her thinking is getting better, but feels like she needs some help walking right now and some work with therapies to be able to get back home. Past Medical History:   Diagnosis Date    Asthma     as a child    Hypercholesterolemia     Hypertension     Ill-defined condition     \"essential tremors with implant in chest on Left side under collar bone connected to the brain\"    Nausea & vomiting     Other ill-defined conditions(239.89)      high cholesterol, osteopenia    Psychiatric disorder     anxiety    Thyroid disease     hypothryroidism    Tremor       Past Surgical History:   Procedure Laterality Date    ABDOMEN SURGERY PROC UNLISTED  1981    partial abdominal Hysterectomy, bladder tacking,    HX APPENDECTOMY      HX BUNIONECTOMY      HX ORTHOPAEDIC  1986    neck fusion,    HX ORTHOPAEDIC Right     Bunionectomy    HX PACEMAKER      Deep brain stim Implanted device to L chest that Dr. Merly Ordonez at [facility] put in for essential tremors    HX TONSILLECTOMY       No family history on file. Social History     Tobacco Use    Smoking status: Never Smoker    Smokeless tobacco: Never Used   Substance Use Topics    Alcohol use:  Yes     Alcohol/week: 2.5 oz     Types: 5 Glasses of wine per week     Comment: 5-6 wine/week       Current Facility-Administered Medications   Medication Dose Route Frequency Provider Last Rate Last Dose    hydrALAZINE (APRESOLINE) 20 mg/mL injection 20 mg  20 mg IntraVENous Q6H PRN Elly White MD        LORazepam (ATIVAN) injection 1 mg  1 mg IntraVENous Q1H PRN Elly White MD        LORazepam (ATIVAN) injection 2 mg  2 mg IntraVENous Q1H PRN Elly White MD        sodium chloride (NS) flush 5-10 mL  5-10 mL IntraVENous Q8H Cam AVITIA MD   10 mL at 12/12/18 1506    sodium chloride (NS) flush 5-10 mL  5-10 mL IntraVENous PRN Paul Darby MD        thiamine mononitrate (B-1) tablet 100 mg  100 mg Oral DAILY Camille White MD   100 mg at 12/12/18 0810    therapeutic multivitamin [facility]) tablet 1 Tab  1 Tab Oral DAILY Dev Jain MD   1 Tab at 70/85/78 5295    folic acid (FOLVITE) tablet 1 mg  1 mg Oral DAILY Camille White MD   1 mg at 12/12/18 0810    acetaminophen (TYLENOL) tablet 650 mg  650 mg Oral Q6H PRN Al Fink MD   650 mg at 12/12/18 1503    propranolol (INDERAL) tablet 40 mg  40 mg Oral BID Al Fink MD   40 mg at 12/12/18 0809    And    propranolol (INDERAL) tablet 20 mg  20 mg Oral BID Al Fink MD   20 mg at 12/12/18 0810    traMADol (ULTRAM) tablet 50 mg  50 mg Oral Q6H PRN Rachel Post MD   50 mg at 12/12/18 0919    sodium chloride (NS) flush 5-10 mL  5-10 mL IntraVENous Q8H Al Fink MD   10 mL at 12/12/18 1507    sodium chloride (NS) flush 5-10 mL  5-10 mL IntraVENous PRN Wilfredo Michelle MD        enoxaparin (LOVENOX) injection 40 mg  40 mg SubCUTAneous Q24H Brandy Michelle MD   40 mg at 12/11/18 2102    ALPRAZolam (XANAX) tablet 0.25 mg  0.25 mg Oral BID PRN Al Fink MD   0.25 mg at 12/12/18 1503    atorvastatin (LIPITOR) tablet 40 mg  40 mg Oral DAILY Wilfredo Michelle MD   40 mg at 12/12/18 0809    fluticasone (FLONASE) 50 mcg/actuation nasal spray 2 Spray  2 Spray Both Nostrils DAILY PRN Wilfredo Michelle MD        levothyroxine (SYNTHROID) tablet 100 mcg  100 mcg Oral Once per day on Sun Mon Tue Thu Fri Al Fink MD   100 mcg at 12/11/18 1021    levothyroxine (SYNTHROID) tablet 50 mcg  50 mcg Oral Once per day on Wed Sat Al Fink MD   50 mcg at 12/12/18 0809    loperamide (IMODIUM) capsule 2 mg  2 mg Oral QID PRN Al Fink MD   2 mg at 12/10/18 1905        Review of Systems   Neurological: Positive for tremors. Negative for facial asymmetry.    All other systems reviewed and are negative. Objective     Vital signs for last 24 hours:  Visit Vitals  /69 (BP 1 Location: Left arm, BP Patient Position: At rest;Sitting)   Pulse 65   Temp 97.3 °F (36.3 °C)   Resp 21   Ht 5' 2\" (1.575 m)   Wt 52.2 kg (115 lb 1.3 oz)   SpO2 97%   BMI 21.05 kg/m²       Intake/Output this shift:  Current Shift: 12/12 0701 - 12/12 1900  In: 300 [P.O.:300]  Out: -   Last 3 Shifts: 12/10 1901 - 12/12 0700  In: 911.7 [P.O.:800;  I.V.:111.7]  Out: 1600 [Urine:1600]    Data Review:   Recent Results (from the past 24 hour(s))   METABOLIC PANEL, BASIC    Collection Time: 12/12/18 12:00 AM   Result Value Ref Range    Sodium 126 (L) 136 - 145 mmol/L    Potassium 3.5 3.5 - 5.1 mmol/L    Chloride 91 (L) 97 - 108 mmol/L    CO2 22 21 - 32 mmol/L    Anion gap 13 5 - 15 mmol/L    Glucose 100 65 - 100 mg/dL    BUN 16 6 - 20 MG/DL    Creatinine 0.68 0.55 - 1.02 MG/DL    BUN/Creatinine ratio 24 (H) 12 - 20      GFR est AA >60 >60 ml/min/1.73m2    GFR est non-AA >60 >60 ml/min/1.73m2    Calcium 8.8 8.5 - 10.1 MG/DL   MAGNESIUM    Collection Time: 12/12/18  3:48 AM   Result Value Ref Range    Magnesium 2.0 1.6 - 2.4 mg/dL   METABOLIC PANEL, BASIC    Collection Time: 12/12/18  3:48 AM   Result Value Ref Range    Sodium 129 (L) 136 - 145 mmol/L    Potassium 3.3 (L) 3.5 - 5.1 mmol/L    Chloride 94 (L) 97 - 108 mmol/L    CO2 24 21 - 32 mmol/L    Anion gap 11 5 - 15 mmol/L    Glucose 99 65 - 100 mg/dL    BUN 13 6 - 20 MG/DL    Creatinine 0.62 0.55 - 1.02 MG/DL    BUN/Creatinine ratio 21 (H) 12 - 20      GFR est AA >60 >60 ml/min/1.73m2    GFR est non-AA >60 >60 ml/min/1.73m2    Calcium 8.5 8.5 - 10.1 MG/DL   CBC WITH AUTOMATED DIFF    Collection Time: 12/12/18  3:48 AM   Result Value Ref Range    WBC 6.2 3.6 - 11.0 K/uL    RBC 3.56 (L) 3.80 - 5.20 M/uL    HGB 10.9 (L) 11.5 - 16.0 g/dL    HCT 31.7 (L) 35.0 - 47.0 %    MCV 89.0 80.0 - 99.0 FL    MCH 30.6 26.0 - 34.0 PG    MCHC 34.4 30.0 - 36.5 g/dL    RDW 12.1 11.5 - 14.5 %    PLATELET 480 244 - 493 K/uL    MPV 9.0 8.9 - 12.9 FL    NRBC 0.0 0  WBC    ABSOLUTE NRBC 0.00 0.00 - 0.01 K/uL    NEUTROPHILS 62 32 - 75 %    LYMPHOCYTES 26 12 - 49 %    MONOCYTES 10 5 - 13 %    EOSINOPHILS 1 0 - 7 %    BASOPHILS 1 0 - 1 %    IMMATURE GRANULOCYTES 0 0.0 - 0.5 %    ABS. NEUTROPHILS 3.8 1.8 - 8.0 K/UL    ABS. LYMPHOCYTES 1.6 0.8 - 3.5 K/UL    ABS. MONOCYTES 0.6 0.0 - 1.0 K/UL    ABS. EOSINOPHILS 0.1 0.0 - 0.4 K/UL    ABS. BASOPHILS 0.0 0.0 - 0.1 K/UL    ABS. IMM. GRANS. 0.0 0.00 - 0.04 K/UL    DF AUTOMATED     SODIUM, UR, RANDOM    Collection Time: 12/12/18  6:30 AM   Result Value Ref Range    Sodium,urine random 48 MMOL/L   OSMOLALITY, UR    Collection Time: 12/12/18  6:30 AM   Result Value Ref Range    Osmolality,urine 457 MOSM/kg Q3P   METABOLIC PANEL, BASIC    Collection Time: 12/12/18  7:49 AM   Result Value Ref Range    Sodium 126 (L) 136 - 145 mmol/L    Potassium 3.4 (L) 3.5 - 5.1 mmol/L    Chloride 92 (L) 97 - 108 mmol/L    CO2 23 21 - 32 mmol/L    Anion gap 11 5 - 15 mmol/L    Glucose 108 (H) 65 - 100 mg/dL    BUN 13 6 - 20 MG/DL    Creatinine 0.60 0.55 - 1.02 MG/DL    BUN/Creatinine ratio 22 (H) 12 - 20      GFR est AA >60 >60 ml/min/1.73m2    GFR est non-AA >60 >60 ml/min/1.73m2    Calcium 8.7 8.5 - 10.1 MG/DL       Physical Exam   Constitutional: She appears well-developed and well-nourished. Eyes: Conjunctivae are normal.   Cardiovascular: Intact distal pulses. Pulmonary/Chest: Effort normal.   Abdominal: Soft. Musculoskeletal: Normal range of motion. Neurological: She has normal strength. She displays normal reflexes. No cranial nerve deficit.  Coordination abnormal.   Familial tremor L>R hand lower frequency on right postural/action  Decreased coordination related to tremor b/l

## 2018-12-12 NOTE — PROGRESS NOTES
Orthopaedic Progress Note  Post Op day: * No surgery found *    2018 10:01 PM     Patient: Ceasar Pacheco MRN: [id number]  SSN: [id number]    YOB: 1945  Age: 68 y.o. Sex: female      Admit date:  2018  Date of Surgery:  * No surgery found *   Procedures:    Admitting Physician:  Franklin Levy MD   Surgeon:  * Surgery not found *    Consulting Physician(s): Treatment Team: Attending Provider: Sunny Blount MD; Consulting Provider: Nagi Hahn MD; Consulting Provider: Bette Blank MD; Utilization Review: Khai Anders; Consulting Provider: Kervin Mccarty MD; Consulting Provider: Leann Abrams MD    SUBJECTIVE:     Ceasar Pacheco is a 68 y.o. female we are following for T10 compression fx/ T11 burst fracture. Pt was transferred to ICU due to severe hyponatremia. Pt receiving 3% sodium per Dr. Komal Skaggs. Pt was unstable to proceed with MRI of T spine today. Pt unable to converse and speak to if her back was hurting/ patient loses train of thought easily    OBJECTIVE:       Physical Exam:  General: Alert, cooperative, no distress. Respiratory: Respirations unlabored  Neurological:  Neurovascular exam within normal limits. Motor: + DF/PF. Did not roll patient. 5/5 strength bilateral LE. Musculoskeletal: Calves soft, supple, non-tender upon palpation.       Vital Signs:        Patient Vitals for the past 8 hrs:   BP Temp Pulse Resp SpO2   18 2000 146/67 98.1 °F (36.7 °C) (!) 101 15 99 %   18 1600 119/59 98.8 °F (37.1 °C) 87 19 98 %   18 1514 116/62  70 16    18 1500   78                                            Temp (24hrs), Av.3 °F (36.8 °C), Min:97.7 °F (36.5 °C), Max:99 °F (37.2 °C)      Labs:        Recent Labs     18  0701   HCT 32.9*   HGB 11.5     Lab Results   Component Value Date/Time    Sodium 123 (L) 2018 05:32 PM    Potassium 3.4 (L) 2018 05:32 PM    Chloride 91 (L) 12/11/2018 05:32 PM    CO2 25 12/11/2018 05:32 PM    Glucose 103 (H) 12/11/2018 05:32 PM    BUN 10 12/11/2018 05:32 PM    Creatinine 0.74 12/11/2018 05:32 PM    Calcium 8.7 12/11/2018 05:32 PM       PT/OT:        Gait  Base of Support: Narrowed  Speed/Hellen: Slow  Step Length: Left shortened, Right shortened  Gait Abnormalities: Path deviations  Ambulation - Level of Assistance: Contact guard assistance  Distance (ft): 40 Feet (ft)  Assistive Device: Walker, rolling, Gait belt       Patient mobility  Bed Mobility Training  Rolling: Contact guard assistance  Supine to Sit: Minimum assistance  Sit to Supine: Contact guard assistance  Scooting: Contact guard assistance  Transfer Training  Sit to Stand: Minimum assistance, Contact guard assistance  Stand to Sit: Contact guard assistance  Bed to Chair: Minimum assistance      Gait Training  Assistive Device: Walker, rolling, Gait belt  Ambulation - Level of Assistance: Contact guard assistance  Distance (ft): 40 Feet (ft)            ASSESSMENT / PLAN:   Principal Problem:    Hyponatremia (12/8/2018)    Active Problems:    Fall (12/8/2018)      Hyperlipidemia (12/8/2018)      Acquired hypothyroidism (12/8/2018)      Unsteady gait (12/8/2018)       Impression:  T10 acute compression fx; has TLSO; if failed conservative tx, consider kyphoplasty  T11 burst fracture: awaiting MRI of T spine for further evaluation; pt remains neurovascularly intact    Follow up after MRI  Severe hyponatremia: being treated in ICU                Pain Control: Adequate with oral agents and PRN IV narcotics   DVT Prophylaxis: Lovenox daily, SCDs    Therapy/ Weight bearing: WBAT with TLSO      Signed By:  Bjorn Porter NP    Orthopedic Trauma Nurse Practitioner  [address] [place] [address]

## 2018-12-12 NOTE — PROGRESS NOTES
0700 - Bedside and Verbal shift change report given to latia martinez (oncoming nurse) by Willam Shields (offgoing nurse). Report included the following information SBAR, Kardex, Intake/Output, MAR, Recent Results and Cardiac Rhythm NSR.   1115 - report called to PCU.   1135 - patient to MRI at this time.

## 2018-12-12 NOTE — PROGRESS NOTES
Orthopaedic Progress Note      2018 4:14 PM     Patient: Tracy Suarez MRN: [id number]  [id number]: [id number]    YOB: 1945  Age: 68 y.o. Sex: female      Admit date:  2018  Admitting Physician:  Sivan Evans MD   Consulting Physician(s): Treatment Team: Attending Provider: Prosper Wayne MD; Consulting Provider: Puja Hill MD; Consulting Provider: Teodoro Peters MD; Utilization Review: Lyndsay Dominguez; Consulting Provider: Jayson Cooper MD; Consulting Provider: Mary Couch MD; Care Manager: Agustin Garay; Consulting Provider: Rogelio Kimball MD; Care Manager: Pablo WOOD    SUBJECTIVE:     Saad García is a 68 y.o. female that was walking in the room without pain and no brace on when I entered. Denies numbness or parasthesias. No complaints of nausea, vomiting, dizziness, lightheadedness, chest pain, or shortness of breath. OBJECTIVE:       Physical Exam:  General: Alert, cooperative, no distress. Respiratory: Respirations unlabored  Neurological:  Neurovascular exam within normal limits. Motor: + DF/PF. 5/5 strength bilateral LE. Musculoskeletal: Calves soft, supple, non-tender upon palpation.           Vital Signs:        Patient Vitals for the past 8 hrs:   BP Temp Pulse Resp SpO2   18 1558 133/69 97.3 °F (36.3 °C) 65 21 97 %   18 1319 127/89 98.9 °F (37.2 °C) 60 21 99 %   18 1134 128/90 99 °F (37.2 °C) 62 28 99 %   18 1100   62 18    18 1000   66 18    18 0900   67 18                                           Temp (24hrs), Av.4 °F (36.9 °C), Min:97.3 °F (36.3 °C), Max:99 °F (37.2 °C)      Labs:        Recent Labs     18  0348   HCT 31.7*   HGB 10.9*     Lab Results   Component Value Date/Time    Sodium 126 (L) 2018 07:49 AM    Potassium 3.4 (L) 2018 07:49 AM    Chloride 92 (L) 2018 07:49 AM    CO2 23 2018 07:49 AM    Glucose 108 (H) 12/12/2018 07:49 AM    BUN 13 12/12/2018 07:49 AM    Creatinine 0.60 12/12/2018 07:49 AM    Calcium 8.7 12/12/2018 07:49 AM       PT/OT:        Gait  Base of Support: Narrowed  Speed/Hellen: Delayed, Pace decreased (<100 feet/min)  Step Length: Right shortened, Left shortened  Gait Abnormalities: Path deviations, Step to gait  Ambulation - Level of Assistance: Contact guard assistance  Distance (ft): 30 Feet (ft)  Assistive Device: Walker, rolling, Gait belt, Brace/Splint       Patient mobility  Bed Mobility Training  Rolling: Contact guard assistance  Supine to Sit: Contact guard assistance  Sit to Supine: Contact guard assistance  Scooting: Contact guard assistance  Transfer Training  Sit to Stand: Contact guard assistance  Stand to Sit: Contact guard assistance  Bed to Chair: Contact guard assistance      Gait Training  Assistive Device: Walker, rolling, Gait belt, Brace/Splint  Ambulation - Level of Assistance: Contact guard assistance  Distance (ft): 30 Feet (ft)            ASSESSMENT / PLAN:   Principal Problem:    Hyponatremia (12/8/2018)    Active Problems:    Fall (12/8/2018)      Hyperlipidemia (12/8/2018)      Acquired hypothyroidism (12/8/2018)      Unsteady gait (12/8/2018)            A: 1. T11 burst fracture  2. Mild retropulsion without cord compromise at T11  3. Mild central stenosis from T11 fracture    P: 1. TLSO at all times when ambulating. Avoid flexion, extension, and rotation of spine when ambulating. Limited pain when ambulating today. I do not think a kyphoplasty is necessary at this time. Will need TLSO for 8-12 weeks. If worsening neurological status please reconsult. Minimal stenosis from fracture. 2.  Orthopedics will sign off. Please reconsult if worsening pain/neurological status. Followup with Dr. Sylvia Roldan in 3 weeks. Discussed case with Dr. Geo Villalpando who agrees with plan.     Signed By:  Gabriella Gil, [address] [place] [address] [facility]

## 2018-12-12 NOTE — WOUND CARE
Wound care consult:  Initial visit for skin assessment - seen in the ICU. Alert, no distress staff at bedside. Assessment  All skin folds and bony prominences assessed, turned with staff assistance. Sacral area  Intact, skin is thin and fragile. Area of redness and rash on the upper right back- states area is itching. Heels and elbows intact. Recommendations/Plan  Continue skin care and preventative measures   Turn, reposition every 2 hours as tolerated, float heels  Incontinent care with comfort shields. Apply moisture barrier as needed. Will follow, reconsult as needed.   Phil Serna

## 2018-12-13 ENCOUNTER — HOSPITAL ENCOUNTER (OUTPATIENT)
Dept: REHABILITATION | Age: 73
End: 2018-12-24
Attending: PHYSICAL MEDICINE & REHABILITATION | Admitting: PHYSICAL MEDICINE & REHABILITATION

## 2018-12-13 VITALS
DIASTOLIC BLOOD PRESSURE: 82 MMHG | TEMPERATURE: 97.6 F | HEART RATE: 62 BPM | RESPIRATION RATE: 18 BRPM | WEIGHT: 108.5 LBS | HEIGHT: 62 IN | OXYGEN SATURATION: 98 % | SYSTOLIC BLOOD PRESSURE: 136 MMHG | BODY MASS INDEX: 19.96 KG/M2

## 2018-12-13 DIAGNOSIS — G93.41 METABOLIC ENCEPHALOPATHY: ICD-10-CM

## 2018-12-13 LAB
ANION GAP SERPL CALC-SCNC: 10 MMOL/L (ref 5–15)
ANION GAP SERPL CALC-SCNC: 11 MMOL/L (ref 5–15)
BUN SERPL-MCNC: 16 MG/DL (ref 6–20)
BUN SERPL-MCNC: 16 MG/DL (ref 6–20)
BUN/CREAT SERPL: 28 (ref 12–20)
BUN/CREAT SERPL: 29 (ref 12–20)
CALCIUM SERPL-MCNC: 8.5 MG/DL (ref 8.5–10.1)
CALCIUM SERPL-MCNC: 8.6 MG/DL (ref 8.5–10.1)
CHLORIDE SERPL-SCNC: 100 MMOL/L (ref 97–108)
CHLORIDE SERPL-SCNC: 100 MMOL/L (ref 97–108)
CO2 SERPL-SCNC: 23 MMOL/L (ref 21–32)
CO2 SERPL-SCNC: 24 MMOL/L (ref 21–32)
CREAT SERPL-MCNC: 0.55 MG/DL (ref 0.55–1.02)
CREAT SERPL-MCNC: 0.57 MG/DL (ref 0.55–1.02)
GLUCOSE SERPL-MCNC: 97 MG/DL (ref 65–100)
GLUCOSE SERPL-MCNC: 97 MG/DL (ref 65–100)
POTASSIUM SERPL-SCNC: 3.7 MMOL/L (ref 3.5–5.1)
POTASSIUM SERPL-SCNC: 3.8 MMOL/L (ref 3.5–5.1)
SODIUM SERPL-SCNC: 134 MMOL/L (ref 136–145)
SODIUM SERPL-SCNC: 134 MMOL/L (ref 136–145)

## 2018-12-13 PROCEDURE — 74011250637 HC RX REV CODE- 250/637: Performed by: INTERNAL MEDICINE

## 2018-12-13 PROCEDURE — 80048 BASIC METABOLIC PNL TOTAL CA: CPT

## 2018-12-13 PROCEDURE — 36415 COLL VENOUS BLD VENIPUNCTURE: CPT

## 2018-12-13 RX ORDER — ASPIRIN 325 MG/1
100 TABLET, FILM COATED ORAL DAILY
Qty: 30 TAB | Refills: 0 | Status: SHIPPED
Start: 2018-12-13

## 2018-12-13 RX ORDER — THERA TABS 400 MCG
1 TAB ORAL DAILY
Qty: 30 TAB | Refills: 0 | Status: SHIPPED
Start: 2018-12-13

## 2018-12-13 RX ORDER — FOLIC ACID 1 MG/1
1 TABLET ORAL DAILY
Qty: 30 TAB | Refills: 0 | Status: SHIPPED
Start: 2018-12-13

## 2018-12-13 RX ADMIN — THERA TABS 1 TABLET: TAB at 08:55

## 2018-12-13 RX ADMIN — Medication 100 MG: at 08:55

## 2018-12-13 RX ADMIN — Medication 10 ML: at 07:20

## 2018-12-13 RX ADMIN — ATORVASTATIN CALCIUM 40 MG: 10 TABLET, FILM COATED ORAL at 08:56

## 2018-12-13 RX ADMIN — PROPRANOLOL HYDROCHLORIDE 20 MG: 10 TABLET ORAL at 08:56

## 2018-12-13 RX ADMIN — LEVOTHYROXINE SODIUM 100 MCG: 100 TABLET ORAL at 09:13

## 2018-12-13 RX ADMIN — FOLIC ACID 1 MG: 1 TABLET ORAL at 08:56

## 2018-12-13 RX ADMIN — LOPERAMIDE HYDROCHLORIDE 2 MG: 2 CAPSULE ORAL at 10:19

## 2018-12-13 RX ADMIN — PROPRANOLOL HYDROCHLORIDE 40 MG: 10 TABLET ORAL at 08:57

## 2018-12-13 RX ADMIN — Medication 10 ML: at 07:21

## 2018-12-13 NOTE — ROUTINE PROCESS
Bedside and Verbal shift change report given to Pa Evans RN (oncoming nurse) by Amaris Olivares RN (offgoing nurse). Report included the following information SBAR, Kardex, Intake/Output, MAR, Accordion and Recent Results. Bedside and Verbal shift change report given to Nannette Sumner RN (oncoming nurse) by Pa Evans RN (offgoing nurse). Report included the following information SBAR, Kardex, Intake/Output, MAR, Accordion and Recent Results.

## 2018-12-13 NOTE — PROGRESS NOTES
12- CASE MANAGEMENT NOTE:  The pt has been accepted by [facility] and can be transferred there today. I informed the pt. and her son-in-law, Wendi Romano (a-[phone]), who will be bringing her clothes. I also asked her RN to call report. Care Management Interventions  PCP Verified by CM: Yes  Mode of Transport at Discharge:  Other (see comment)  Transition of Care Consult (CM Consult): (Rehab)  Discharge Durable Medical Equipment: No  Physical Therapy Consult: Yes  Occupational Therapy Consult: Yes  Current Support Network: Lives Alone, Own Home  Confirm Follow Up Transport: Family  Plan discussed with Pt/Family/Caregiver: Yes  Freedom of Choice Offered: Yes  Discharge Location  Discharge Placement: ([facility])    SAJAN Serrano, CM

## 2018-12-13 NOTE — DISCHARGE SUMMARY
Hospitalist Discharge Summary     Patient ID:    Charly Jha  [id number]  54 y.o.  1945    Admit date: 12/8/2018    Discharge date and time: 12/13/2018    Admission Diagnoses: Hyponatremia  Hyponatremia  Hyponatremia    Chronic Diagnoses:    Problem List as of 12/13/2018 Date Reviewed: 11/19/2013          Codes Class Noted - Resolved    * (Principal) Hyponatremia ICD-10-CM: E87.1  ICD-9-CM: 276.1  12/8/2018 - Present        Fall ICD-10-CM: W19. Laura Washington  ICD-9-CM: E888.9  12/8/2018 - Present        Hyperlipidemia ICD-10-CM: E78.5  ICD-9-CM: 272.4  12/8/2018 - Present        Acquired hypothyroidism ICD-10-CM: E03.9  ICD-9-CM: 244.9  12/8/2018 - Present        Unsteady gait ICD-10-CM: R26.81  ICD-9-CM: 781.2  12/8/2018 - Present        Hallux valgus (acquired) ICD-10-CM: M20.10  ICD-9-CM: 735.0  10/18/2012 - Present              Discharge Medications:   Current Discharge Medication List      START taking these medications    Details   folic acid (FOLVITE) 1 mg tablet Take 1 Tab by mouth daily. Qty: 30 Tab, Refills: 0      therapeutic multivitamin (THERAGRAN) tablet Take 1 Tab by mouth daily. Qty: 30 Tab, Refills: 0      thiamine mononitrate (B-1) 100 mg tablet Take 1 Tab by mouth daily. Qty: 30 Tab, Refills: 0         CONTINUE these medications which have NOT CHANGED    Details   !! levothyroxine (SYNTHROID) 100 mcg tablet Take 50 mcg by mouth two (2) days a week. Patient takes 50 mcg on Wednesday and Saturday      propranolol (INDERAL) 60 mg tablet Take 60 mg by mouth two (2) times a day. fluticasone (FLONASE ALLERGY RELIEF) 50 mcg/actuation nasal spray 2 Sprays by Both Nostrils route daily as needed for Rhinitis. calcium citrate/vitamin D3 (CALCIUM CITRATE + D PO) Take 2 Tabs by mouth two (2) times a day. loperamide (IMMODIUM) 2 mg tablet Take 2 mg by mouth four (4) times daily as needed for Diarrhea.       ALPRAZolam (XANAX) 0.25 mg tablet Take 0.25 mg by mouth two (2) times daily as needed for Anxiety. atorvastatin (LIPITOR) 40 mg tablet Take 40 mg by mouth daily. ibandronate (BONIVA) 150 mg tablet Take 150 mg by mouth every thirty (30) days. !! levothyroxine (SYNTHROID) 100 mcg tablet Take 100 mcg by mouth five (5) days a week. Patient takes 100 mcg on Monday, Tuesday, Thursday, Friday, Sunday       !! - Potential duplicate medications found. Please discuss with provider. STOP taking these medications       clobetasol (TEMOVATE) 0.05 % external solution Comments:   Reason for Stopping:         FLUoxetine (PROZAC) 20 mg tablet Comments:   Reason for Stopping:         magnesium 250 mg Tab Comments:   Reason for Stopping: Follow up Care:    1. Nghia Goldstein MD in 1-2 weeks  2. Ortho  3. psychiatry  4. Neuropsychology     Diet:  Regular Diet    Disposition:  Rehab. Advanced Directive:    Discharge Exam:  See today's note. CONSULTATIONS: Nephrology, Neurology and Psychiatry    Significant Diagnostic Studies:   Recent Labs     12/12/18  0348 12/11/18  0701   WBC 6.2 5.1   HGB 10.9* 11.5   HCT 31.7* 32.9*    382     Recent Labs     12/13/18  0219 12/13/18  0156 12/12/18  1900  12/12/18  0348   * 134* 130*   < > 129*   K 3.8 3.7 4.6   < > 3.3*    100 97   < > 94*   CO2 23 24 24   < > 24   BUN 16 16 19   < > 13   CREA 0.55 0.57 0.55   < > 0.62   GLU 97 97 99   < > 99   CA 8.5 8.6 8.6   < > 8.5   MG  --   --   --   --  2.0    < > = values in this interval not displayed. No results for input(s): SGOT, GPT, ALT, AP, TBIL, TBILI, TP, ALB, GLOB, GGT, AML, LPSE in the last 72 hours. No lab exists for component: AMYP, HLPSE  No results for input(s): INR, PTP, APTT in the last 72 hours. No lab exists for component: INREXT   No results for input(s): FE, TIBC, PSAT, FERR in the last 72 hours. No results for input(s): PH, PCO2, PO2 in the last 72 hours. No results for input(s): CPK, CKMB in the last 72 hours.     No lab exists for component: TROPONINI  Lab Results   Component Value Date/Time    Glucose (POC) 86 08/03/2009 08:37 AM             HOSPITAL COURSE & TREATMENT RENDERED:   1. Hyponatremia: acute on chronic. 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 s/p hypertonic saline and tolvaptan. sodium is better  Discontinue SSRI indefinitely      2. Metabolic encephalopathy. likely multifactorial. Possibly 2/2 alcohol use/abuse, hyponatremia and meds. Evaluated by neurology    Head CT without acute process  -CT C and T spine without acute neurologic findings other than T10 and T11 fractures. Continue TLSO at all times when ambulating. Avoid flexion, extension, and rotation of spine when ambulating. Limited pain when ambulating today. Follow up with Dr Vineet James, orthopedics. 3.  Unsteady gait/ Fall/ BL leg weakness: frequent falls. Head CT showed no acute process. 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 No diagnosis of Parkinson's. Accepted to [address]. Suspect alcohol use may be contributing to gait instability. Evaluated by neurology      4. Confusion/short term memory loss - suspect 2/2 dementia, meds, alcohol use. Will need neuropsych eval as an outpt   -B12, folate WNL     5. Hyperlipidemia: on statin      6. Acquired hypothyroidism (): TSH WNR. On synthroid      7. Familial tremors:   -continue propranolol, deep brain stimulator implanted as above     8. Anxiety/ depression: Xanax PRN would NOT be a good long-term choice for her due to age, alcohol use and gait instability, and her SSRI is stopped due to possible SIADH  -psych eval appreciated; will begin xanax taper in the next few days      9.  T10/T11 fractures   -TLSO brace   -PT/OT eval    outpatient FU      10. Chronic diarrhea: reportedly had full workup with her PCP and according to pt and daughter. Would follow up outpatient. On imodium.      11. Alcohol abuse - stable.  Doesn't require ativan      Discharged in stable condition         Signed:  Jean Marie Armstrong MD  12/13/2018  7:59 AM

## 2018-12-13 NOTE — PROGRESS NOTES
[facility]    Saad Vazquez  YOB: 1945          Assessment & Plan:   1. Hyponatremia  - Na 134 mmmol  - no more 3% and  tolvaptan  - repeat urine dw SIADH  - fluid restriction 1.2 lit. - if she goes back on SSRI: needs close Na monitoring, encourage protein intake  2. Hypokalemia  - better  3. ? AMS  - needs neropsych       Subjective:   CC:HYPONATREMIA  HPI: Patient seen   Sodium 134 mmol: s/p Tolvaptan. AO3, Family at bedside.   No CP/SON/N.V   ROS:Neg for 12 sys  Current Facility-Administered Medications   Medication Dose Route Frequency    hydrALAZINE (APRESOLINE) 20 mg/mL injection 20 mg  20 mg IntraVENous Q6H PRN    LORazepam (ATIVAN) injection 1 mg  1 mg IntraVENous Q1H PRN    LORazepam (ATIVAN) injection 2 mg  2 mg IntraVENous Q1H PRN    sodium chloride (NS) flush 5-10 mL  5-10 mL IntraVENous Q8H    sodium chloride (NS) flush 5-10 mL  5-10 mL IntraVENous PRN    thiamine mononitrate (B-1) tablet 100 mg  100 mg Oral DAILY    therapeutic multivitamin (THERAGRAN) tablet 1 Tab  1 Tab Oral DAILY    folic acid (FOLVITE) tablet 1 mg  1 mg Oral DAILY    acetaminophen (TYLENOL) tablet 650 mg  650 mg Oral Q6H PRN    propranolol (INDERAL) tablet 40 mg  40 mg Oral BID    And    propranolol (INDERAL) tablet 20 mg  20 mg Oral BID    traMADol (ULTRAM) tablet 50 mg  50 mg Oral Q6H PRN    sodium chloride (NS) flush 5-10 mL  5-10 mL IntraVENous Q8H    sodium chloride (NS) flush 5-10 mL  5-10 mL IntraVENous PRN    enoxaparin (LOVENOX) injection 40 mg  40 mg SubCUTAneous Q24H    ALPRAZolam (XANAX) tablet 0.25 mg  0.25 mg Oral BID PRN    atorvastatin (LIPITOR) tablet 40 mg  40 mg Oral DAILY    fluticasone (FLONASE) 50 mcg/actuation nasal spray 2 Spray  2 Spray Both Nostrils DAILY PRN    levothyroxine (SYNTHROID) tablet 100 mcg  100 mcg Oral Once per day on Sun Mon Tue Thu Fri    levothyroxine (SYNTHROID) tablet 50 mcg  50 mcg Oral Once per day on Wed Sat    loperamide (IMODIUM) capsule 2 mg  2 mg Oral QID PRN          Objective:     Vitals:  Blood pressure 149/78, pulse (!) 59, temperature 97.6 °F (36.4 °C), resp. rate 16, height 5' 2\" (1.575 m), weight 49.2 kg (108 lb 8 oz), SpO2 99 %. Temp (24hrs), Av.2 °F (36.8 °C), Min:97.3 °F (36.3 °C), Max:99 °F (37.2 °C)      Intake and Output:  No intake/output data recorded. 1901 -  0700  In: 400 [P.O.:300; I.V.:100]  Out: 1100 [Urine:1100]    Physical Exam:                Patient is intubated:  no    Physical Examination:   GENERAL ASSESSMENT: NAD  HEENT:Nontraumatic   CHEST: CTA  HEART: S1S2  ABDOMEN: Soft,NT,  :Reyna: n  EXTREMITY: EDEMA n  NEURO:Grossly non focal          ECG/rhythm:    Data Review      No results for input(s): TNIPOC in the last 72 hours. No lab exists for component: ITNL   No results for input(s): CPK, CKMB, TROIQ in the last 72 hours. Recent Labs     18  0219 18  0156 18  1900  18  0348  18  0701   * 134* 130*   < > 129*   < > 122*   K 3.8 3.7 4.6   < > 3.3*   < > 3.5    100 97   < > 94*   < > 89*   CO2 23 24 24   < > 24   < > 25   BUN 16 16 19   < > 13   < > 5*   CREA 0.55 0.57 0.55   < > 0.62   < > 0.46*   GLU 97 97 99   < > 99   < > 114*   MG  --   --   --   --  2.0  --   --    CA 8.5 8.6 8.6   < > 8.5   < > 8.7   WBC  --   --   --   --  6.2  --  5.1   HGB  --   --   --   --  10.9*  --  11.5   HCT  --   --   --   --  31.7*  --  32.9*   PLT  --   --   --   --  361  --  382    < > = values in this interval not displayed. No results for input(s): INR, PTP, APTT in the last 72 hours.     No lab exists for component: INREXT, INREXT  Needs: urine analysis, urine sodium, protein and creatinine  Lab Results   Component Value Date/Time    Sodium,urine random 48 2018 06:30 AM         Discussed with:  Nursing    : Zahra Mack MD  2018        [facility]:  [email or web address]  [place] office:  [address] [place] [address] [facility], [address] [place] [address],[address]  [place], [address] [place] [address]  Phone: [phone]  Fax :     [phone]    [place] office:  [address] [place] [address], [address] [place] [address]  Phone - [phone]  Fax - [phone]

## 2018-12-13 NOTE — PROGRESS NOTES
In preparation for discharge to [facility] today, I have completed Care Plans, Education and AVS Med-Update in [address] [facility]. Waiting for bed assignment. Will continue to follow.

## 2018-12-13 NOTE — PROGRESS NOTES
Shift Summary    0720  Bedside and Verbal shift change report given to CARLITOS Madrigal RN (oncoming nurse) by  Dave Sy RN (offgoing nurse). Report included the following information SBAR, Kardex, MAR, Recent Results and Cardiac Rhythm  . Introduced myself as primary RN. Assumed care of patient. Updated, discussed and explained plan of care to patient, including patient's expectations of visit. Pt given opportunity to ask questions. Pt advised that medical information will be discussed and it is their own responsibility to tell nurse if such conversation should not take place in the presence of visitors. Pt verbalizes understanding. Pt denies any additional complaints at this time. Pt resting on stretcher in low/locked position. Call bell in reach. Pt care whiteboard updated and approximate length of time for test results explained to patient. Kendy Villatoro RN       Discharge order in. Awaiting case management needs. Case management aware. TRANSFER - OUT REPORT:    Verbal report given to [facility] (name) on Saad DESTINEE Vazquez  being transferred to [employer]) for change in patient condition( Rehab facility)       Report consisted of patients Situation, Background, Assessment and   Recommendations(SBAR). Information from the following report(s) SBAR, Kardex, MAR, Recent Results and Cardiac Rhythm NSR was reviewed with the receiving nurse. Lines:       Opportunity for questions and clarification was provided.       Patient transported with:   [employer]

## 2018-12-13 NOTE — PROGRESS NOTES
Pharmacist Discharge Medication Reconciliation    Discharge Provider:  Dr. Short Dears       Discharge Medications:      My Medications        START taking these medications        Instructions Each Dose to Equal Morning Noon Evening Bedtime   folic acid 1 mg tablet  Commonly known as:  [employer]  Notes to patient:  supplement      Take 1 Tab by mouth daily. 1 mg         therapeutic multivitamin tablet  Commonly known as:  Bjorn Singh  Notes to patient:  supplement      Take 1 Tab by mouth daily. 1 Tab         thiamine mononitrate 100 mg tablet  Commonly known as:  B-1  Notes to patient:  supplement      Take 1 Tab by mouth daily. 100 mg                CONTINUE taking these medications        Instructions Each Dose to Equal Morning Noon Evening Bedtime   BONIVA 150 mg tablet  Generic drug:  ibandronate      Take 150 mg by mouth every thirty (30) days. 150 mg         CALCIUM CITRATE + D PO      Take 2 Tabs by mouth two (2) times a day. 2 Tab         FLONASE ALLERGY RELIEF 50 mcg/actuation nasal spray  Generic drug:  fluticasone      2 Sprays by Both Nostrils route daily as needed for Rhinitis. 2 Spray         LIPITOR 40 mg tablet  Generic drug:  atorvastatin      Take 40 mg by mouth daily. 40 mg         loperamide 2 mg tablet  Commonly known as:  IMMODIUM      Take 2 mg by mouth four (4) times daily as needed for Diarrhea.   2 mg         propranolol 60 mg tablet  Commonly known as:  INDERAL      Take 60 mg by mouth two (2) times a day. 60 mg         * SYNTHROID 100 mcg tablet  Generic drug:  levothyroxine      Take 100 mcg by mouth five (5) days a week. Patient takes 100 mcg on Monday, Tuesday, Thursday, Friday, Sunday   100 mcg         * levothyroxine 100 mcg tablet  Commonly known as:  SYNTHROID      Take 50 mcg by mouth two (2) days a week.  Patient takes 50 mcg on Wednesday and Saturday   50 mcg         XANAX 0.25 mg tablet  Generic drug:  ALPRAZolam      Take 0.25 mg by mouth two (2) times daily as needed for Anxiety. 0.25 mg               * This list has 2 medication(s) that are the same as other medications prescribed for you. Read the directions carefully, and ask your doctor or other care provider to review them with you. STOP taking these medications      clobetasol 0.05 % external solution  Commonly known as:  TEMOVATE        FLUoxetine 20 mg tablet  Commonly known as:  PROzac        magnesium 250 mg Tab                  Where to Get Your Medications        Information on where to get these meds will be given to you by the nurse or doctor.     Ask your nurse or doctor about these medications  · folic acid 1 mg tablet  · therapeutic multivitamin tablet  · thiamine mononitrate 100 mg tablet        The patient's chart, MAR, and AVS were reviewed by   MIKA Grossman,   Contact: [phone]

## 2018-12-13 NOTE — DISCHARGE INSTRUCTIONS
ACUTE DIAGNOSES:  Hyponatremia  Hyponatremia  Hyponatremia    CHRONIC MEDICAL DIAGNOSES:  Problem List as of 12/13/2018 Date Reviewed: 11/19/2013          Codes Class Noted - Resolved    * (Principal) Hyponatremia ICD-10-CM: E87.1  ICD-9-CM: 276.1  12/8/2018 - Present        Fall ICD-10-CM: W19. Kathaleen Door  ICD-9-CM: E888.9  12/8/2018 - Present        Hyperlipidemia ICD-10-CM: E78.5  ICD-9-CM: 272.4  12/8/2018 - Present        Acquired hypothyroidism ICD-10-CM: E03.9  ICD-9-CM: 244.9  12/8/2018 - Present        Unsteady gait ICD-10-CM: R26.81  ICD-9-CM: 781.2  12/8/2018 - Present        Hallux valgus (acquired) ICD-10-CM: M20.10  ICD-9-CM: 735.0  10/18/2012 - Present              DISCHARGE MEDICATIONS:          · It is important that you take the medication exactly as they are prescribed. · Keep your medication in the bottles provided by the pharmacist and keep a list of the medication names, dosages, and times to be taken in your wallet. · Do not take other medications without consulting your doctor. DIET:  Regular Diet    ACTIVITY: Activity as tolerated    ADDITIONAL INFORMATION: If you experience any of the following symptoms then please call your primary care physician or return to the emergency room if you cannot get hold of your doctor: Fever, chills, nausea, vomiting, diarrhea, change in mentation, falling, bleeding, shortness of breath. FOLLOW UP CARE:  Dr. Carlos Horton MD  you are to call and set up an appointment to see them in 2 weeks. Follow-up with Dr Vineet James, orthopedics in 3 weeks. Follow up with neuropsychiatry in 4 weeks. Dr Kayla Marlow     Follow up with psychiatry in 2-3 weeks       Information obtained by :  I understand that if any problems occur once I am at home I am to contact my physician. I understand and acknowledge receipt of the instructions indicated above. Physician's or R.N.'s Signature                                                                  Date/Time                                                                                                                                              Patient or Representative Signature                                                          Date/Time

## 2018-12-13 NOTE — PROGRESS NOTES
[facility]  [address] [facility], [place], [address] [place] [address]  [phone]      Medical Progress Note      NAME: Evelyn Lazar   :  1945  MRM:  [id number]    Date/Time: 2018  7:19 AM       Assessment and Plan:   1.   Hyponatremia: acute on chronic. 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 s/p hypertonic saline and tolvaptan. sodium is better    Discontinue SSRI indefinitely     2. Metabolic encephalopathy. likely multifactorial. Possibly 2/2 alcohol use/abuse, hyponatremia and meds. Evaluated by neurology    Head CT without acute process  -CT C and T spine without acute neurologic findings other than T10 and T11 fractures      3. Unsteady gait/ Fall/ BL leg weakness: frequent falls. Head CT showed no acute process. 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 No diagnosis of Parkinson's. Accepted to [facility]. Suspect alcohol use may be contributing to gait instability. Evaluated by neurology      4. Confusion/short term memory loss - suspect 2/2 dementia, meds, alcohol use. Will need neuropsych eval as an outpt   -B12, folate WNL     5. Hyperlipidemia: on statin      6. Acquired hypothyroidism (): TSH WNR. On synthroid      7. Familial tremors:   -continue propranolol, deep brain stimulator implanted as above     8. Anxiety/ depression: Xanax PRN would NOT be a good long-term choice for her due to age, alcohol use and gait instability, and her SSRI is stopped due to possible SIADH  -psych eval appreciated; will begin xanax taper in the next few days      9.  T10/T11 fractures   -TLSO brace   -PT/OT eval    outpatient FU      10. Chronic diarrhea: reportedly had full workup with her PCP and according to pt and daughter. Would follow up outpatient. On imodium.      11. Alcohol abuse - stable. Doesn't require ativan                     Subjective:     Chief Complaint:  Follow upof pt who was admitted with hyponatremia. Feels well.      ROS:  (bold if positive, if negative)      Tolerating PT  Tolerating Diet        Objective:     Last 24hrs VS reviewed since prior progress note.  Most recent are:    Visit Vitals  /88 (BP 1 Location: Right arm, BP Patient Position: At rest)   Pulse 67   Temp 98.4 °F (36.9 °C)   Resp 18   Ht 5' 2\" (1.575 m)   Wt 49.2 kg (108 lb 8 oz)   SpO2 99%   BMI 19.84 kg/m²     SpO2 Readings from Last 6 Encounters:   12/13/18 99%   05/28/14 100%   11/01/12 100%            Intake/Output Summary (Last 24 hours) at 12/13/2018 0719  Last data filed at 12/12/2018 1026  Gross per 24 hour   Intake 300 ml   Output    Net 300 ml        Physical Exam:    Gen:  Well-developed, well-nourished, in no acute distress  HEENT:  Pink conjunctivae, PERRL, hearing intact to voice, moist mucous membranes  Neck:  Supple, without masses, thyroid non-tender  Resp:  No accessory muscle use, clear breath sounds without wheezes rales or rhonchi  Card:  No murmurs, normal S1, S2 without thrills, bruits or peripheral edema  Abd:  Soft, non-tender, non-distended, normoactive bowel sounds are present, no palpable organomegaly and no detectable hernias  Lymph:  No cervical or inguinal adenopathy  Musc:  No cyanosis or clubbing  Skin:  No rashes or ulcers, skin turgor is good  Neuro:  Cranial nerves are grossly intact, no focal motor weakness, follows commands appropriately  Psych:  Good insight, oriented to person, place and time, alert  __________________________________________________________________  Medications Reviewed: (see below)  Medications:     Current Facility-Administered Medications   Medication Dose Route Frequency    hydrALAZINE (APRESOLINE) 20 mg/mL injection 20 mg  20 mg IntraVENous Q6H PRN    LORazepam (ATIVAN) injection 1 mg  1 mg IntraVENous Q1H PRN    LORazepam (ATIVAN) injection 2 mg  2 mg IntraVENous Q1H PRN    sodium chloride (NS) flush 5-10 mL  5-10 mL IntraVENous Q8H    sodium chloride (NS) flush 5-10 mL  5-10 mL IntraVENous PRN    thiamine mononitrate (B-1) tablet 100 mg  100 mg Oral DAILY    therapeutic multivitamin (THERAGRAN) tablet 1 Tab  1 Tab Oral DAILY    folic acid (FOLVITE) tablet 1 mg  1 mg Oral DAILY    acetaminophen (TYLENOL) tablet 650 mg  650 mg Oral Q6H PRN    propranolol (INDERAL) tablet 40 mg  40 mg Oral BID    And    propranolol (INDERAL) tablet 20 mg  20 mg Oral BID    traMADol (ULTRAM) tablet 50 mg  50 mg Oral Q6H PRN    sodium chloride (NS) flush 5-10 mL  5-10 mL IntraVENous Q8H    sodium chloride (NS) flush 5-10 mL  5-10 mL IntraVENous PRN    enoxaparin (LOVENOX) injection 40 mg  40 mg SubCUTAneous Q24H    ALPRAZolam (XANAX) tablet 0.25 mg  0.25 mg Oral BID PRN    atorvastatin (LIPITOR) tablet 40 mg  40 mg Oral DAILY    fluticasone (FLONASE) 50 mcg/actuation nasal spray 2 Spray  2 Spray Both Nostrils DAILY PRN    levothyroxine (SYNTHROID) tablet 100 mcg  100 mcg Oral Once per day on Sun Mon Tue Thu Fri    levothyroxine (SYNTHROID) tablet 50 mcg  50 mcg Oral Once per day on Wed Sat    loperamide (IMODIUM) capsule 2 mg  2 mg Oral QID PRN        Lab Data Reviewed: (see below)  Lab Review:     Recent Labs     12/12/18  0348 12/11/18  0701   WBC 6.2 5.1   HGB 10.9* 11.5   HCT 31.7* 32.9*    382     Recent Labs     12/13/18  0219 12/13/18  0156 12/12/18  1900  12/12/18  0348   * 134* 130*   < > 129*   K 3.8 3.7 4.6   < > 3.3*    100 97   < > 94*   CO2 23 24 24   < > 24   GLU 97 97 99   < > 99   BUN 16 16 19   < > 13   CREA 0.55 0.57 0.55   < > 0.62   CA 8.5 8.6 8.6   < > 8.5   MG  --   --   --   --  2.0    < > = values in this interval not displayed. Lab Results   Component Value Date/Time    Glucose (POC) 86 08/03/2009 08:37 AM     No results for input(s): PH, PCO2, PO2, HCO3, FIO2 in the last 72 hours. No results for input(s): INR in the last 72 hours.     No lab exists for component: INREXT  All Micro Results     Procedure Component Value Units Date/Time    CULTURE, MRSA [[id number]] Collected:  12/11/18 1131    Order Status:  Completed Specimen:  Nares Updated:  12/12/18 4322     Special Requests: NO SPECIAL REQUESTS        Culture result: MRSA NOT PRESENT                   Screening of patient nares for MRSA is for surveillance purposes and, if positive, to facilitate isolation considerations in high risk settings. It is not intended for automatic decolonization interventions per se as regimens are not sufficiently effective to warrant routine use. I have reviewed notes of prior 24hr. Other pertinent lab:       Total time spent with patient: [place] 59 discussed with: Patient, Family, Nursing Staff and >50% of time spent in counseling and coordination of care    Discussed:  Care Plan    Prophylaxis:  Lovenox    Disposition:  [facility]/Rehab           ___________________________________________________    Attending Physician: Mira Bobby MD

## 2018-12-14 LAB
25(OH)D3 SERPL-MCNC: 33.1 NG/ML (ref 30–100)
ALBUMIN SERPL-MCNC: 3.4 G/DL (ref 3.5–5)
ALBUMIN/GLOB SERPL: 1.2 {RATIO} (ref 1.1–2.2)
ALP SERPL-CCNC: 82 U/L (ref 45–117)
ALT SERPL-CCNC: 41 U/L (ref 12–78)
ANION GAP SERPL CALC-SCNC: 11 MMOL/L (ref 5–15)
APPEARANCE UR: CLEAR
AST SERPL-CCNC: 30 U/L (ref 15–37)
BACTERIA URNS QL MICRO: NEGATIVE /HPF
BASOPHILS # BLD: 0.1 K/UL (ref 0–0.1)
BASOPHILS NFR BLD: 1 % (ref 0–1)
BILIRUB SERPL-MCNC: 0.4 MG/DL (ref 0.2–1)
BILIRUB UR QL: NEGATIVE
BUN SERPL-MCNC: 11 MG/DL (ref 6–20)
BUN/CREAT SERPL: 21 (ref 12–20)
CALCIUM SERPL-MCNC: 8.8 MG/DL (ref 8.5–10.1)
CHLORIDE SERPL-SCNC: 95 MMOL/L (ref 97–108)
CO2 SERPL-SCNC: 25 MMOL/L (ref 21–32)
COLOR UR: NORMAL
CREAT SERPL-MCNC: 0.52 MG/DL (ref 0.55–1.02)
DIFFERENTIAL METHOD BLD: ABNORMAL
EOSINOPHIL # BLD: 0 K/UL (ref 0–0.4)
EOSINOPHIL NFR BLD: 0 % (ref 0–7)
EPITH CASTS URNS QL MICRO: NORMAL /LPF
ERYTHROCYTE [DISTWIDTH] IN BLOOD BY AUTOMATED COUNT: 12.5 % (ref 11.5–14.5)
GLOBULIN SER CALC-MCNC: 2.9 G/DL (ref 2–4)
GLUCOSE SERPL-MCNC: 87 MG/DL (ref 65–100)
GLUCOSE UR STRIP.AUTO-MCNC: NEGATIVE MG/DL
HCT VFR BLD AUTO: 30.4 % (ref 35–47)
HGB BLD-MCNC: 10.5 G/DL (ref 11.5–16)
HGB UR QL STRIP: NEGATIVE
HYALINE CASTS URNS QL MICRO: NORMAL /LPF (ref 0–5)
IMM GRANULOCYTES # BLD: 0 K/UL (ref 0–0.04)
IMM GRANULOCYTES NFR BLD AUTO: 0 % (ref 0–0.5)
KETONES UR QL STRIP.AUTO: NEGATIVE MG/DL
LEUKOCYTE ESTERASE UR QL STRIP.AUTO: NEGATIVE
LYMPHOCYTES # BLD: 1.4 K/UL (ref 0.8–3.5)
LYMPHOCYTES NFR BLD: 16 % (ref 12–49)
MCH RBC QN AUTO: 31.1 PG (ref 26–34)
MCHC RBC AUTO-ENTMCNC: 34.5 G/DL (ref 30–36.5)
MCV RBC AUTO: 89.9 FL (ref 80–99)
MONOCYTES # BLD: 0.9 K/UL (ref 0–1)
MONOCYTES NFR BLD: 10 % (ref 5–13)
NEUTS SEG # BLD: 6 K/UL (ref 1.8–8)
NEUTS SEG NFR BLD: 72 % (ref 32–75)
NITRITE UR QL STRIP.AUTO: NEGATIVE
NRBC # BLD: 0 K/UL (ref 0–0.01)
NRBC BLD-RTO: 0 PER 100 WBC
PH UR STRIP: 6.5 [PH] (ref 5–8)
PLATELET # BLD AUTO: 351 K/UL (ref 150–400)
PMV BLD AUTO: 9.7 FL (ref 8.9–12.9)
POTASSIUM SERPL-SCNC: 3.4 MMOL/L (ref 3.5–5.1)
PROT SERPL-MCNC: 6.3 G/DL (ref 6.4–8.2)
PROT UR STRIP-MCNC: NEGATIVE MG/DL
RBC # BLD AUTO: 3.38 M/UL (ref 3.8–5.2)
RBC #/AREA URNS HPF: NORMAL /HPF (ref 0–5)
SODIUM SERPL-SCNC: 131 MMOL/L (ref 136–145)
SP GR UR REFRACTOMETRY: 1.01 (ref 1–1.03)
UROBILINOGEN UR QL STRIP.AUTO: 0.2 EU/DL (ref 0.2–1)
VIT B1 BLD-SCNC: 82 NMOL/L (ref 66.5–200)
WBC # BLD AUTO: 8.3 K/UL (ref 3.6–11)
WBC URNS QL MICRO: NORMAL /HPF (ref 0–4)

## 2018-12-14 PROCEDURE — 81001 URINALYSIS AUTO W/SCOPE: CPT

## 2018-12-14 PROCEDURE — 80053 COMPREHEN METABOLIC PANEL: CPT

## 2018-12-14 PROCEDURE — 36415 COLL VENOUS BLD VENIPUNCTURE: CPT

## 2018-12-14 PROCEDURE — 85025 COMPLETE CBC W/AUTO DIFF WBC: CPT

## 2018-12-14 PROCEDURE — 82306 VITAMIN D 25 HYDROXY: CPT

## 2018-12-16 LAB
ALBUMIN SERPL-MCNC: 3.9 G/DL (ref 3.5–5)
ALBUMIN/GLOB SERPL: 1.3 {RATIO} (ref 1.1–2.2)
ALP SERPL-CCNC: 105 U/L (ref 45–117)
ALT SERPL-CCNC: 40 U/L (ref 12–78)
ANION GAP SERPL CALC-SCNC: 8 MMOL/L (ref 5–15)
AST SERPL-CCNC: 25 U/L (ref 15–37)
BASOPHILS # BLD: 0.1 K/UL (ref 0–0.1)
BASOPHILS NFR BLD: 1 % (ref 0–1)
BILIRUB SERPL-MCNC: 0.3 MG/DL (ref 0.2–1)
BUN SERPL-MCNC: 18 MG/DL (ref 6–20)
BUN/CREAT SERPL: 29 (ref 12–20)
CALCIUM SERPL-MCNC: 9.2 MG/DL (ref 8.5–10.1)
CHLORIDE SERPL-SCNC: 96 MMOL/L (ref 97–108)
CO2 SERPL-SCNC: 27 MMOL/L (ref 21–32)
CREAT SERPL-MCNC: 0.62 MG/DL (ref 0.55–1.02)
DIFFERENTIAL METHOD BLD: ABNORMAL
EOSINOPHIL # BLD: 0 K/UL (ref 0–0.4)
EOSINOPHIL NFR BLD: 0 % (ref 0–7)
ERYTHROCYTE [DISTWIDTH] IN BLOOD BY AUTOMATED COUNT: 12.5 % (ref 11.5–14.5)
ERYTHROCYTE [SEDIMENTATION RATE] IN BLOOD: 20 MM/HR (ref 0–30)
GLOBULIN SER CALC-MCNC: 3.1 G/DL (ref 2–4)
GLUCOSE SERPL-MCNC: 118 MG/DL (ref 65–100)
HCT VFR BLD AUTO: 36.4 % (ref 35–47)
HGB BLD-MCNC: 12.5 G/DL (ref 11.5–16)
IMM GRANULOCYTES # BLD: 0 K/UL (ref 0–0.04)
IMM GRANULOCYTES NFR BLD AUTO: 0 % (ref 0–0.5)
LYMPHOCYTES # BLD: 0.9 K/UL (ref 0.8–3.5)
LYMPHOCYTES NFR BLD: 14 % (ref 12–49)
MCH RBC QN AUTO: 31.3 PG (ref 26–34)
MCHC RBC AUTO-ENTMCNC: 34.3 G/DL (ref 30–36.5)
MCV RBC AUTO: 91.2 FL (ref 80–99)
MONOCYTES # BLD: 0.6 K/UL (ref 0–1)
MONOCYTES NFR BLD: 9 % (ref 5–13)
NEUTS SEG # BLD: 4.5 K/UL (ref 1.8–8)
NEUTS SEG NFR BLD: 75 % (ref 32–75)
NRBC # BLD: 0 K/UL (ref 0–0.01)
NRBC BLD-RTO: 0 PER 100 WBC
PLATELET # BLD AUTO: 408 K/UL (ref 150–400)
PMV BLD AUTO: 9 FL (ref 8.9–12.9)
POTASSIUM SERPL-SCNC: 4.5 MMOL/L (ref 3.5–5.1)
PROT SERPL-MCNC: 7 G/DL (ref 6.4–8.2)
RBC # BLD AUTO: 3.99 M/UL (ref 3.8–5.2)
SODIUM SERPL-SCNC: 131 MMOL/L (ref 136–145)
WBC # BLD AUTO: 6 K/UL (ref 3.6–11)

## 2018-12-16 PROCEDURE — 80053 COMPREHEN METABOLIC PANEL: CPT

## 2018-12-16 PROCEDURE — 36415 COLL VENOUS BLD VENIPUNCTURE: CPT

## 2018-12-16 PROCEDURE — 85025 COMPLETE CBC W/AUTO DIFF WBC: CPT

## 2018-12-16 PROCEDURE — 85652 RBC SED RATE AUTOMATED: CPT

## 2018-12-17 LAB
OSMOLALITY UR: 755 MOSM/KG H2O
POTASSIUM UR-SCNC: 53 MMOL/L
SODIUM UR-SCNC: 50 MMOL/L
UR CULT HOLD, URHOLD: NORMAL

## 2018-12-17 PROCEDURE — 83935 ASSAY OF URINE OSMOLALITY: CPT

## 2018-12-17 PROCEDURE — 84300 ASSAY OF URINE SODIUM: CPT

## 2018-12-17 PROCEDURE — 84133 ASSAY OF URINE POTASSIUM: CPT

## 2018-12-18 LAB
ALBUMIN SERPL-MCNC: 3.3 G/DL (ref 3.5–5)
ALBUMIN/GLOB SERPL: 1.1 {RATIO} (ref 1.1–2.2)
ALP SERPL-CCNC: 84 U/L (ref 45–117)
ALT SERPL-CCNC: 34 U/L (ref 12–78)
ANION GAP SERPL CALC-SCNC: 9 MMOL/L (ref 5–15)
AST SERPL-CCNC: 20 U/L (ref 15–37)
BASOPHILS # BLD: 0 K/UL (ref 0–0.1)
BASOPHILS NFR BLD: 1 % (ref 0–1)
BILIRUB SERPL-MCNC: 0.3 MG/DL (ref 0.2–1)
BUN SERPL-MCNC: 17 MG/DL (ref 6–20)
BUN/CREAT SERPL: 31 (ref 12–20)
CALCIUM SERPL-MCNC: 8.9 MG/DL (ref 8.5–10.1)
CHLORIDE SERPL-SCNC: 100 MMOL/L (ref 97–108)
CO2 SERPL-SCNC: 25 MMOL/L (ref 21–32)
CORTIS AM PEAK SERPL-MCNC: 10.9 UG/DL (ref 4.3–22.45)
CREAT SERPL-MCNC: 0.54 MG/DL (ref 0.55–1.02)
DIFFERENTIAL METHOD BLD: ABNORMAL
EOSINOPHIL # BLD: 0.1 K/UL (ref 0–0.4)
EOSINOPHIL NFR BLD: 2 % (ref 0–7)
ERYTHROCYTE [DISTWIDTH] IN BLOOD BY AUTOMATED COUNT: 12.6 % (ref 11.5–14.5)
GLOBULIN SER CALC-MCNC: 2.9 G/DL (ref 2–4)
GLUCOSE SERPL-MCNC: 84 MG/DL (ref 65–100)
HCT VFR BLD AUTO: 29.8 % (ref 35–47)
HGB BLD-MCNC: 10.1 G/DL (ref 11.5–16)
IMM GRANULOCYTES # BLD: 0 K/UL (ref 0–0.04)
IMM GRANULOCYTES NFR BLD AUTO: 0 % (ref 0–0.5)
LYMPHOCYTES # BLD: 1.4 K/UL (ref 0.8–3.5)
LYMPHOCYTES NFR BLD: 28 % (ref 12–49)
MCH RBC QN AUTO: 31.1 PG (ref 26–34)
MCHC RBC AUTO-ENTMCNC: 33.9 G/DL (ref 30–36.5)
MCV RBC AUTO: 91.7 FL (ref 80–99)
MONOCYTES # BLD: 0.7 K/UL (ref 0–1)
MONOCYTES NFR BLD: 14 % (ref 5–13)
NEUTS SEG # BLD: 2.9 K/UL (ref 1.8–8)
NEUTS SEG NFR BLD: 56 % (ref 32–75)
NRBC # BLD: 0 K/UL (ref 0–0.01)
NRBC BLD-RTO: 0 PER 100 WBC
OSMOLALITY SERPL: 285 MOSM/KG H2O
PHOSPHATE SERPL-MCNC: 3.8 MG/DL (ref 2.6–4.7)
PLATELET # BLD AUTO: 354 K/UL (ref 150–400)
PMV BLD AUTO: 9.8 FL (ref 8.9–12.9)
POTASSIUM SERPL-SCNC: 3.7 MMOL/L (ref 3.5–5.1)
PROT SERPL-MCNC: 6.2 G/DL (ref 6.4–8.2)
RBC # BLD AUTO: 3.25 M/UL (ref 3.8–5.2)
SODIUM SERPL-SCNC: 134 MMOL/L (ref 136–145)
TSH SERPL DL<=0.05 MIU/L-ACNC: 1.41 UIU/ML (ref 0.36–3.74)
WBC # BLD AUTO: 5.2 K/UL (ref 3.6–11)

## 2018-12-18 PROCEDURE — 87046 STOOL CULTR AEROBIC BACT EA: CPT

## 2018-12-18 PROCEDURE — 85025 COMPLETE CBC W/AUTO DIFF WBC: CPT

## 2018-12-18 PROCEDURE — 87077 CULTURE AEROBIC IDENTIFY: CPT

## 2018-12-18 PROCEDURE — 84443 ASSAY THYROID STIM HORMONE: CPT

## 2018-12-18 PROCEDURE — 82533 TOTAL CORTISOL: CPT

## 2018-12-18 PROCEDURE — 84100 ASSAY OF PHOSPHORUS: CPT

## 2018-12-18 PROCEDURE — 36415 COLL VENOUS BLD VENIPUNCTURE: CPT

## 2018-12-18 PROCEDURE — 83930 ASSAY OF BLOOD OSMOLALITY: CPT

## 2018-12-18 PROCEDURE — 80053 COMPREHEN METABOLIC PANEL: CPT

## 2018-12-19 LAB
ALBUMIN SERPL-MCNC: 3.3 G/DL (ref 3.5–5)
ANION GAP SERPL CALC-SCNC: 10 MMOL/L (ref 5–15)
BUN SERPL-MCNC: 19 MG/DL (ref 6–20)
BUN/CREAT SERPL: 35 (ref 12–20)
CALCIUM SERPL-MCNC: 8.7 MG/DL (ref 8.5–10.1)
CHLORIDE SERPL-SCNC: 99 MMOL/L (ref 97–108)
CO2 SERPL-SCNC: 25 MMOL/L (ref 21–32)
CREAT SERPL-MCNC: 0.55 MG/DL (ref 0.55–1.02)
GLUCOSE SERPL-MCNC: 95 MG/DL (ref 65–100)
PHOSPHATE SERPL-MCNC: 4.3 MG/DL (ref 2.6–4.7)
POTASSIUM SERPL-SCNC: 4 MMOL/L (ref 3.5–5.1)
SODIUM SERPL-SCNC: 134 MMOL/L (ref 136–145)

## 2018-12-19 PROCEDURE — 36415 COLL VENOUS BLD VENIPUNCTURE: CPT

## 2018-12-19 PROCEDURE — 80069 RENAL FUNCTION PANEL: CPT

## 2018-12-20 LAB
ALBUMIN SERPL-MCNC: 3.4 G/DL (ref 3.5–5)
ANION GAP SERPL CALC-SCNC: 6 MMOL/L (ref 5–15)
BUN SERPL-MCNC: 14 MG/DL (ref 6–20)
BUN/CREAT SERPL: 25 (ref 12–20)
CALCIUM SERPL-MCNC: 8.9 MG/DL (ref 8.5–10.1)
CHLORIDE SERPL-SCNC: 100 MMOL/L (ref 97–108)
CO2 SERPL-SCNC: 28 MMOL/L (ref 21–32)
CREAT SERPL-MCNC: 0.56 MG/DL (ref 0.55–1.02)
GLUCOSE SERPL-MCNC: 85 MG/DL (ref 65–100)
PHOSPHATE SERPL-MCNC: 3.7 MG/DL (ref 2.6–4.7)
POTASSIUM SERPL-SCNC: 4.7 MMOL/L (ref 3.5–5.1)
SODIUM SERPL-SCNC: 134 MMOL/L (ref 136–145)

## 2018-12-20 PROCEDURE — 36415 COLL VENOUS BLD VENIPUNCTURE: CPT

## 2018-12-20 PROCEDURE — 80069 RENAL FUNCTION PANEL: CPT

## 2018-12-21 ENCOUNTER — APPOINTMENT (OUTPATIENT)
Dept: GENERAL RADIOLOGY | Age: 73
End: 2018-12-21

## 2018-12-21 LAB
ALBUMIN SERPL-MCNC: 3.5 G/DL (ref 3.5–5)
ANION GAP SERPL CALC-SCNC: 7 MMOL/L (ref 5–15)
BACTERIA SPEC CULT: NORMAL
BUN SERPL-MCNC: 17 MG/DL (ref 6–20)
BUN/CREAT SERPL: 28 (ref 12–20)
C JEJUNI+C COLI AG STL QL: NEGATIVE
CALCIUM SERPL-MCNC: 9.1 MG/DL (ref 8.5–10.1)
CHLORIDE SERPL-SCNC: 98 MMOL/L (ref 97–108)
CO2 SERPL-SCNC: 25 MMOL/L (ref 21–32)
CREAT SERPL-MCNC: 0.61 MG/DL (ref 0.55–1.02)
E COLI SXT1+2 STL IA: NEGATIVE
GLUCOSE SERPL-MCNC: 89 MG/DL (ref 65–100)
PHOSPHATE SERPL-MCNC: 4.8 MG/DL (ref 2.6–4.7)
POTASSIUM SERPL-SCNC: 4.1 MMOL/L (ref 3.5–5.1)
SERVICE CMNT-IMP: NORMAL
SODIUM SERPL-SCNC: 130 MMOL/L (ref 136–145)

## 2018-12-21 PROCEDURE — 80069 RENAL FUNCTION PANEL: CPT

## 2018-12-21 PROCEDURE — 71046 X-RAY EXAM CHEST 2 VIEWS: CPT

## 2018-12-21 PROCEDURE — 36415 COLL VENOUS BLD VENIPUNCTURE: CPT

## 2018-12-22 LAB
ALBUMIN SERPL-MCNC: 3.8 G/DL (ref 3.5–5)
ANION GAP SERPL CALC-SCNC: 6 MMOL/L (ref 5–15)
BUN SERPL-MCNC: 22 MG/DL (ref 6–20)
BUN/CREAT SERPL: 37 (ref 12–20)
CALCIUM SERPL-MCNC: 9 MG/DL (ref 8.5–10.1)
CHLORIDE SERPL-SCNC: 98 MMOL/L (ref 97–108)
CO2 SERPL-SCNC: 27 MMOL/L (ref 21–32)
CREAT SERPL-MCNC: 0.6 MG/DL (ref 0.55–1.02)
GLUCOSE SERPL-MCNC: 87 MG/DL (ref 65–100)
PHOSPHATE SERPL-MCNC: 4.3 MG/DL (ref 2.6–4.7)
POTASSIUM SERPL-SCNC: 4.1 MMOL/L (ref 3.5–5.1)
SODIUM SERPL-SCNC: 131 MMOL/L (ref 136–145)

## 2018-12-22 PROCEDURE — 36415 COLL VENOUS BLD VENIPUNCTURE: CPT

## 2018-12-22 PROCEDURE — 80069 RENAL FUNCTION PANEL: CPT

## 2019-09-04 ENCOUNTER — OFFICE VISIT (OUTPATIENT)
Dept: OBGYN CLINIC | Age: 74
End: 2019-09-04

## 2019-09-04 VITALS
WEIGHT: 109.6 LBS | DIASTOLIC BLOOD PRESSURE: 60 MMHG | BODY MASS INDEX: 20.17 KG/M2 | SYSTOLIC BLOOD PRESSURE: 92 MMHG | HEIGHT: 62 IN

## 2019-09-04 DIAGNOSIS — Z01.419 ENCOUNTER FOR GYNECOLOGICAL EXAMINATION WITHOUT ABNORMAL FINDING: Primary | ICD-10-CM

## 2019-09-04 NOTE — PROGRESS NOTES
Annual exam ages 69+ post hysterectomy    Saad Alfonso is a ,  76 y.o. female   No LMP recorded. Patient has had a hysterectomy. She presents for her annual checkup. She is having no significant problems. With regard to the Gardasil vaccine, she is older than the age for which it is FDA approved. Hormonal status:  She reports no perimenstrual type symptoms. She is not having vasomotor symptoms. The patient is not using any ERT. Sexual history:    She  reports that she does not currently engage in sexual activity but has had partner(s) who are Male. She reports using the following method of birth control/protection: Surgical.    Medical conditions:    Since her last annual GYN exam about three or more years ago, she has not the following changes in her health history: none. Surgical history confirmed with patient. has a past surgical history that includes hx bunionectomy; hx tonsillectomy; hx appendectomy; pr abdomen surgery proc unlisted (); hx orthopaedic (); hx orthopaedic (Right); hx pacemaker; and pr total abdom hysterectomy (). Pap and Mammogram History:    Her most recent Pap smear was normal obtained several year(s) ago-unknown per pt    The patient has not had a recent mammogram.    Breast Cancer History/Substance Abuse: negative      Osteoporosis History:    Family history does not include a first or second degree relative with osteopenia or osteoporosis. A bone density scan was obtained in  and revealed Osteopenia.        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 Willian Garza at [facility] put in for essential tremors    HX TONSILLECTOMY      TOTAL ABDOM HYSTERECTOMY  1981       Current Outpatient Medications   Medication Sig Dispense Refill    folic acid (FOLVITE) 1 mg tablet Take 1 Tab by mouth daily. 30 Tab 0    therapeutic multivitamin (THERAGRAN) tablet Take 1 Tab by mouth daily. 30 Tab 0    thiamine mononitrate (B-1) 100 mg tablet Take 1 Tab by mouth daily. 30 Tab 0    levothyroxine (SYNTHROID) 100 mcg tablet Take 50 mcg by mouth two (2) days a week. Patient takes 50 mcg on Wednesday and Saturday      propranolol (INDERAL) 60 mg tablet Take 60 mg by mouth two (2) times a day.  fluticasone (FLONASE ALLERGY RELIEF) 50 mcg/actuation nasal spray 2 Sprays by Both Nostrils route daily as needed for Rhinitis.  calcium citrate/vitamin D3 (CALCIUM CITRATE + D PO) Take 2 Tabs by mouth two (2) times a day.  loperamide (IMMODIUM) 2 mg tablet Take 2 mg by mouth four (4) times daily as needed for Diarrhea.  ALPRAZolam (XANAX) 0.25 mg tablet Take 0.25 mg by mouth two (2) times daily as needed for Anxiety.  atorvastatin (LIPITOR) 40 mg tablet Take 40 mg by mouth daily.  ibandronate (BONIVA) 150 mg tablet Take 150 mg by mouth every thirty (30) days. Allergies: Latex, natural rubber and Latex     Tobacco History:  reports that she has never smoked. She has never used smokeless tobacco.  Alcohol Abuse:  reports that she drinks about 4.2 standard drinks of alcohol per week. Drug Abuse:  reports that she does not use drugs. Family Medical/Cancer History: History reviewed. No pertinent family history.      Review of Systems - History obtained from the patient  Constitutional: negative for weight loss, fever, night sweats  HEENT: negative for hearing loss, earache, congestion, snoring, sorethroat  CV: negative for chest pain, palpitations, edema  Resp: negative for cough, shortness of breath, wheezing  GI: negative for change in bowel habits, abdominal pain, black or bloody stools  : negative for frequency, dysuria, hematuria, vaginal discharge  MSK: negative for back pain, joint pain, muscle pain  Breast: negative for breast lumps, nipple discharge, galactorrhea  Skin :negative for itching, rash, hives  Neuro: negative for dizziness, headache, confusion, weakness  Psych: negative for anxiety, depression, change in mood  Heme/lymph: negative for bleeding, bruising, pallor    Physical Exam    Visit Vitals  BP 92/60   Ht 5' 2\" (1.575 m)   Wt 109 lb 9.6 oz (49.7 kg)   BMI 20.05 kg/m²       Constitutional  · Appearance: well-nourished, well developed, alert, in no acute distress    HENT  · Head and Face: appears normal    Neck  · Inspection/Palpation: normal appearance, no masses or tenderness  · Lymph Nodes: no lymphadenopathy present  · Thyroid: gland size normal, nontender, no nodules or masses present on palpation    Chest  · Respiratory Effort: breathing unlabored  · Auscultation: normal breath sounds    Cardiovascular  · Heart:  · Auscultation: regular rate and rhythm without murmur    Breasts  · Inspection of Breasts: breasts symmetrical, no skin changes, no discharge present, nipple appearance normal, no skin retraction present  · Palpation of Breasts and Axillae: no masses present on palpation, no breast tenderness  · Axillary Lymph Nodes: no lymphadenopathy present    Gastrointestinal  · Abdominal Examination: abdomen non-tender to palpation, normal bowel sounds, no masses present  · Liver and spleen: no hepatomegaly present, spleen not palpable  · Hernias: no hernias identified    Genitourinary  : normal appearance for age--atrophic, no discharge present, no tenderness present, no inflammatory lesions present vaginal vault: with flattened rugae and atrophy present--mesh is palpable in a couple areas with minimal tenderness--a nidus of scar left posterior, no discharge present, no inflammatory lesions present, no masses present  : nontender to palpation  : appears normal  : absent  : absent  : no adnexal tenderness present, no adnexal masses present  : perineum within normal limits, no evidence of trauma, no skin lesions present  : anus within normal limits, no hemorrhoids present  : no lymphadenopathy present     Skin  · General Inspection: no rash, no lesions identified    Neurologic/Psychiatric  · Mental Status:  · Orientation: grossly oriented to person, place and time  · Mood and Affect: mood normal, affect appropriate    Assessment:  Routine gynecologic examination  Her current medical status is satisfactory with no evidence of significant gynecologic issues.     Plan:  Counseled re: diet, exercise, healthy lifestyle  Return for yearly wellness visits  Rec annual mammogram

## 2019-09-04 NOTE — PATIENT INSTRUCTIONS

## 2019-12-17 ENCOUNTER — APPOINTMENT (OUTPATIENT)
Dept: GENERAL RADIOLOGY | Age: 74
End: 2019-12-17
Attending: EMERGENCY MEDICINE
Payer: MEDICARE

## 2019-12-17 ENCOUNTER — HOSPITAL ENCOUNTER (EMERGENCY)
Age: 74
Discharge: HOME | End: 2019-12-17
Attending: EMERGENCY MEDICINE
Payer: MEDICARE

## 2019-12-17 VITALS
BODY MASS INDEX: 20.43 KG/M2 | DIASTOLIC BLOOD PRESSURE: 86 MMHG | RESPIRATION RATE: 18 BRPM | OXYGEN SATURATION: 100 % | HEART RATE: 65 BPM | SYSTOLIC BLOOD PRESSURE: 169 MMHG | TEMPERATURE: 98.4 F | WEIGHT: 111 LBS | HEIGHT: 62 IN
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
## 2019-12-17 DIAGNOSIS — M79.601 PAIN OF RIGHT UPPER EXTREMITY: Primary | ICD-10-CM

## 2019-12-17 DIAGNOSIS — S01.01XA LACERATION OF SCALP, INITIAL ENCOUNTER: ICD-10-CM

## 2019-12-17 PROCEDURE — 73030 X-RAY EXAM OF SHOULDER: CPT

## 2019-12-17 PROCEDURE — 99284 EMERGENCY DEPT VISIT MOD MDM: CPT

## 2019-12-17 PROCEDURE — 73060 X-RAY EXAM OF HUMERUS: CPT

## 2019-12-17 RX ORDER — ACETAMINOPHEN 325 MG/1
650 TABLET ORAL
Status: DISCONTINUED | OUTPATIENT
Start: 2019-12-17 | End: 2019-12-17 | Stop reason: HOSPADM

## 2019-12-17 RX ORDER — DICLOFENAC SODIUM 75 MG/1
75 TABLET, DELAYED RELEASE ORAL 2 TIMES DAILY
Qty: 10 TAB | Refills: 0 | Status: SHIPPED | OUTPATIENT
Start: 2019-12-17

## 2019-12-17 NOTE — DISCHARGE INSTRUCTIONS
Patient Education        Preventing Falls: Care Instructions  Your Care Instructions    Getting around your home safely can be a challenge if you have injuries or health problems that make it easy for you to fall. Loose rugs and furniture in walkways are among the dangers for many older people who have problems walking or who have poor eyesight. People who have conditions such as arthritis, osteoporosis, or dementia also have to be careful not to fall. You can make your home safer with a few simple measures. Follow-up care is a key part of your treatment and safety. Be sure to make and go to all appointments, and call your doctor if you are having problems. It's also a good idea to know your test results and keep a list of the medicines you take. How can you care for yourself at home? Taking care of yourself  · You may get dizzy if you do not drink enough water. To prevent dehydration, drink plenty of fluids, enough so that your urine is light yellow or clear like water. Choose water and other caffeine-free clear liquids. If you have kidney, heart, or liver disease and have to limit fluids, talk with your doctor before you increase the amount of fluids you drink. · Exercise regularly to improve your strength, muscle tone, and balance. Walk if you can. Swimming may be a good choice if you cannot walk easily. · Have your vision and hearing checked each year or any time you notice a change. If you have trouble seeing and hearing, you might not be able to avoid objects and could lose your balance. · Know the side effects of the medicines you take. Ask your doctor or pharmacist whether the medicines you take can affect your balance. Sleeping pills or sedatives can affect your balance. · Limit the amount of alcohol you drink. Alcohol can impair your balance and other senses. · Ask your doctor whether calluses or corns on your feet need to be removed.  If you wear loose-fitting shoes because of calluses or corns, you can lose your balance and fall. · Talk to your doctor if you have numbness in your feet. Preventing falls at home  · Remove raised doorway thresholds, throw rugs, and clutter. Repair loose carpet or raised areas in the floor. · Move furniture and electrical cords to keep them out of walking paths. · Use nonskid floor wax, and wipe up spills right away, especially on ceramic tile floors. · If you use a walker or cane, put rubber tips on it. If you use crutches, clean the bottoms of them regularly with an abrasive pad, such as steel wool. · Keep your house well lit, especially Kathie Hopping, and outside walkways. Use night-lights in areas such as hallways and bathrooms. Add extra light switches or use remote switches (such as switches that go on or off when you clap your hands) to make it easier to turn lights on if you have to get up during the night. · Install sturdy handrails on stairways. · Move items in your cabinets so that the things you use a lot are on the lower shelves (about waist level). · Keep a cordless phone and a flashlight with new batteries by your bed. If possible, put a phone in each of the main rooms of your house, or carry a cell phone in case you fall and cannot reach a phone. Or, you can wear a device around your neck or wrist. You push a button that sends a signal for help. · Wear low-heeled shoes that fit well and give your feet good support. Use footwear with nonskid soles. Check the heels and soles of your shoes for wear. Repair or replace worn heels or soles. · Do not wear socks without shoes on wood floors. · Walk on the grass when the sidewalks are slippery. If you live in an area that gets snow and ice in the winter, sprinkle salt on slippery steps and sidewalks. Preventing falls in the bath  · Install grab bars and nonskid mats inside and outside your shower or tub and near the toilet and sinks. · Use shower chairs and bath benches.   · Use a hand-held shower head that will allow you to sit while showering. · Get into a tub or shower by putting the weaker leg in first. Get out of a tub or shower with your strong side first.  · Repair loose toilet seats and consider installing a raised toilet seat to make getting on and off the toilet easier. · Keep your bathroom door unlocked while you are in the shower. Where can you learn more? Go to http://laura-ashley.info/. Enter [id number] [id number] in the search box to learn more about \"Preventing Falls: Care Instructions. \"  Current as of: March 16, 2018  Content Version: 11.8  © 2178-9528 [employer]. Care instructions adapted under license by [employer] (which disclaims liability or warranty for this information). If you have questions about a medical condition or this instruction, always ask your healthcare professional. [place] [address] any warranty or liability for your use of this information. Patient Education        Cuts: Care Instructions  Your Care Instructions  A cut can happen anywhere on your body. Stitches, staples, skin adhesives, or pieces of tape called Steri-Strips are sometimes used to keep the edges of a cut together and help it heal. Steri-Strips can be used by themselves or with stitches or staples. Sometimes cuts are left open. If the cut went deep and through the skin, the doctor may have closed the cut in two layers. A deeper layer of stitches brings the deep part of the cut together. These stitches will dissolve and don't need to be removed. The upper layer closure, which could be stitches, staples, Steri-Strips, or adhesive, is what you see on the cut. A cut is often covered by a bandage. The doctor has checked you carefully, but problems can develop later. If you notice any problems or new symptoms, get medical treatment right away. Follow-up care is a key part of your treatment and safety.  Be sure to make and go to all appointments, and call your doctor if you are having problems. It's also a good idea to know your test results and keep a list of the medicines you take. How can you care for yourself at home? If a cut is open or closed  · Prop up the sore area on a pillow anytime you sit or lie down during the next 3 days. Try to keep it above the level of your heart. This will help reduce swelling. · Keep the cut dry for the first 24 to 48 hours. After this, you can shower if your doctor okays it. Pat the cut dry. · Don't soak the cut, such as in a bathtub. Your doctor will tell you when it's safe to get the cut wet. · After the first 24 to 48 hours, clean the cut with soap and water 2 times a day unless your doctor gives you different instructions. ? Don't use hydrogen peroxide or alcohol, which can slow healing. ? You may cover the cut with a thin layer of petroleum jelly and a nonstick bandage. ? If the doctor put a bandage over the cut, put on a new bandage after cleaning the cut or if the bandage gets wet or dirty. · Avoid any activity that could cause your cut to reopen. · Be safe with medicines. Read and follow all instructions on the label. ? If the doctor gave you a prescription medicine for pain, take it as prescribed. ? If you are not taking a prescription pain medicine, ask your doctor if you can take an over-the-counter medicine. If the cut is closed with stitches, staples, or Steri-Strips  · Follow the above instructions for open or closed cuts. · Do not remove the stitches or staples on your own. Your doctor will tell you when to come back to have the stitches or staples removed. · Leave Steri-Strips on until they fall off. If the cut is closed with a skin adhesive  · Follow the above instructions for open or closed cuts. · Leave the skin adhesive on your skin until it falls off on its own. This may take 5 to 10 days. · Do not scratch, rub, or pick at the adhesive.   · Do not put the sticky part of a bandage directly on the adhesive. · Do not put any kind of ointment, cream, or lotion over the area. This can make the adhesive fall off too soon. Do not use hydrogen peroxide or alcohol, which can slow healing. When should you call for help? Call your doctor now or seek immediate medical care if:    · You have new pain, or your pain gets worse.     · The skin near the cut is cold or pale or changes color.     · You have tingling, weakness, or numbness near the cut.     · The cut starts to bleed, and blood soaks through the bandage. Oozing small amounts of blood is normal.     · You have trouble moving the area near the cut.     · You have symptoms of infection, such as:  ? Increased pain, swelling, warmth, or redness around the cut.  ? Red streaks leading from the cut.  ? Pus draining from the cut.  ? A fever.    Watch closely for changes in your health, and be sure to contact your doctor if:    · The cut reopens.     · You do not get better as expected. Where can you learn more? Go to http://laura-ashley.info/. Enter M735 in the search box to learn more about \"Cuts: Care Instructions. \"  Current as of: June 26, 2019  Content Version: 12.2  © 1340-8221 [employer]. Care instructions adapted under license by [employer] (which disclaims liability or warranty for this information). If you have questions about a medical condition or this instruction, always ask your healthcare professional. [place] [address] any warranty or liability for your use of this information. Arm Pain: Care Instructions  Your Care Instructions    You can hurt your arm by using it too much or by injuring it. Biking, wrestling, and home repair projects are examples of activities that can lead to arm pain. Everyday wear and tear, especially as you get older, can cause arm pain. Your forearms, wrists, hands, and fingers are the parts of your arm that are most likely to become painful.   A minor arm injury usually will heal on its own with home treatment to relieve swelling and pain. If you have a more serious injury, you may need tests and treatment. Follow-up care is a key part of your treatment and safety. Be sure to make and go to all appointments, and call your doctor if you are having problems. It's also a good idea to know your test results and keep a list of the medicines you take. How can you care for yourself at home? · Take pain medicines exactly as directed. ? If the doctor gave you a prescription medicine for pain, take it as prescribed. ? If you are not taking a prescription pain medicine, ask your doctor if you can take an over-the-counter medicine. · Rest and protect your arm. Take a break from any activity that may cause pain. · Put ice or a cold pack on your arm for 10 to 20 minutes at a time. Put a thin cloth between the ice and your skin. · Prop up the sore arm on a pillow when you ice it or anytime you sit or lie down during the next 3 days. Try to keep it above the level of your heart. This will help reduce swelling. · If your doctor recommends a sling to support your arm, wear it as directed. When should you call for help? Call 911 anytime you think you may need emergency care. For example, call if:    · Your arm or hand is cool or pale or changes color.    Call your doctor now or seek immediate medical care if:    · You cannot use your arm.     · You have signs of infection, such as:  ? Increased pain, swelling, warmth, or redness. ? Red streaks running up or down your arm. ? Pus draining from an area of your arm. ? A fever.     · You have tingling, weakness, or numbness in your arm.    Watch closely for changes in your health, and be sure to contact your doctor if:    · You do not get better as expected. Where can you learn more? Go to http://laura-ashley.info/.   Enter B641 in the search box to learn more about \"Arm Pain: Care Instructions. \"  Current as of: June 26, 2019  Content Version: 12.2  © 8379-4875 [employer]. Care instructions adapted under license by [employer] (which disclaims liability or warranty for this information).  If you have questions about a medical condition or this instruction, always ask your healthcare professional. [place] [address] any warranty or liability for your use of this information.

## 2019-12-17 NOTE — ED TRIAGE NOTES
Patient arrives via EMS from [place] with c/o mechanical GLF in shower today; no LOC, neck or back pain; c/o R shoulder pain and laceration to distal head pain, bleeding controlled. H/o R shoulder injury and device in L head/neck/chest to control tremors in R hand; states it needs to be turned off prior to CT.

## 2019-12-17 NOTE — ED PROVIDER NOTES
Pt is a 76 y.o. F with PMH of HTN, HLD, tremors with a brain stimulator here with c/o right shoulder pain and head laceration after a fall. She fell in the shower. No LOC. No head pain but did have laceration to back of head with bleeding controlled. She is not on anticoagulants. No other complaints at this time. The history is provided by the patient. No  was used. Past Medical History:   Diagnosis Date    Asthma     as a child    Hypercholesterolemia     Hypertension     Occasional tremors     \"essential tremors with implant in chest on Left side under collar bone connected to the brain\"    Osteopenia     Psychiatric disorder     anxiety    Thyroid disease     hypothryroidism       Past Surgical History:   Procedure Laterality Date    ABDOMEN SURGERY PROC UNLISTED  1981    partial abdominal Hysterectomy, bladder tacking,    HX APPENDECTOMY      HX BUNIONECTOMY      HX ORTHOPAEDIC  1986    neck fusion,    HX ORTHOPAEDIC Right     Bunionectomy    HX PACEMAKER      Deep brain stim Implanted device to L chest that Dr. Erasmo Huitron at [facility] put in for essential tremors    HX TONSILLECTOMY      TOTAL ABDOM HYSTERECTOMY  1981         No family history on file. Social History     Socioeconomic History    Marital status: SINGLE     Spouse name: Not on file    Number of children: Not on file    Years of education: Not on file    Highest education level: Not on file   Occupational History    Not on file   Social Needs    Financial resource strain: Not on file    Food insecurity:     Worry: Not on file     Inability: Not on file    Transportation needs:     Medical: Not on file     Non-medical: Not on file   Tobacco Use    Smoking status: Never Smoker    Smokeless tobacco: Never Used   Substance and Sexual Activity    Alcohol use: Yes     Alcohol/week: 4.2 standard drinks     Types: 5 Glasses of wine per week     Comment: 5-6 wine/week    Drug use:  No  Sexual activity: Not Currently     Partners: Male     Birth control/protection: Surgical     Comment: SALUD   Lifestyle    Physical activity:     Days per week: Not on file     Minutes per session: Not on file    Stress: Not on file   Relationships    Social connections:     Talks on phone: Not on file     Gets together: Not on file     Attends Congregation service: Not on file     Active member of club or organization: Not on file     Attends meetings of clubs or organizations: Not on file     Relationship status: Not on file    Intimate partner violence:     Fear of current or ex partner: Not on file     Emotionally abused: Not on file     Physically abused: Not on file     Forced sexual activity: Not on file   Other Topics Concern    Not on file   Social History Narrative    ** Merged History Encounter **              ALLERGIES: Latex, natural rubber and Latex    Review of Systems   Constitutional: Negative for chills, diaphoresis and fever. HENT: Negative for congestion and trouble swallowing. Eyes: Negative for photophobia and visual disturbance. Respiratory: Negative for cough, chest tightness and shortness of breath. Cardiovascular: Negative for chest pain, palpitations and leg swelling. Gastrointestinal: Negative for abdominal pain, diarrhea, nausea and vomiting. Genitourinary: Negative for difficulty urinating, dysuria, flank pain and frequency. Musculoskeletal: Positive for arthralgias. Negative for back pain and myalgias. Skin: Positive for wound. Negative for rash. Neurological: Positive for tremors. Negative for dizziness, weakness, light-headedness and headaches. Hematological: Negative for adenopathy. Does not bruise/bleed easily. Psychiatric/Behavioral: Negative for agitation and confusion. All other systems reviewed and are negative.       Vitals:    12/17/19 1021   BP: (!) 199/96   Pulse: 65   Resp: 18   Temp: 98.4 °F (36.9 °C)   SpO2: 100%   Weight: 50.3 kg (111 lb) Height: 5' 2\" (1.575 m)            Physical Exam  Vitals signs and nursing note reviewed. Constitutional:       General: She is not in acute distress. Appearance: She is well-developed. She is not diaphoretic. HENT:      Head: Normocephalic. Eyes:      Conjunctiva/sclera: Conjunctivae normal.      Pupils: Pupils are equal, round, and reactive to light. Neck:      Musculoskeletal: Normal range of motion and neck supple. Vascular: No JVD. Cardiovascular:      Rate and Rhythm: Normal rate and regular rhythm. Pulses: Normal pulses. Heart sounds: Normal heart sounds. Pulmonary:      Effort: Pulmonary effort is normal.      Breath sounds: Normal breath sounds. Abdominal:      General: Bowel sounds are normal. There is no distension. Palpations: Abdomen is soft. Tenderness: There is no tenderness. Musculoskeletal:      Comments: Tenderness to R humerus and pain with ROM. No deformity. Good pulses. Lymphadenopathy:      Cervical: No cervical adenopathy. Skin:     General: Skin is warm and dry. Capillary Refill: Capillary refill takes less than 2 seconds. Findings: No erythema or rash. Comments: Puncture wound to right occipital scalp. No active bleeding. Neurological:      Mental Status: She is alert and oriented to person, place, and time. Cranial Nerves: No cranial nerve deficit. Sensory: No sensory deficit. Note written by Pushpa Martinez, as dictated by Faisal Bhandari MD 11:10 AM      MDM       Procedures      Repeat vitals    Visit Vitals  /86   Pulse 65   Temp 98.4 °F (36.9 °C)   Resp 18   Ht 5' 2\" (1.575 m)   Wt 50.3 kg (111 lb)   SpO2 100%   BMI 20.30 kg/m²       Patient's results have been reviewed with them.   Patient and/or family have verbally conveyed their understanding and agreement of the patient's signs, symptoms, diagnosis, treatment and prognosis and additionally agree to follow up as recommended or return to the Emergency Room should their condition change prior to follow-up. Discharge instructions have also been provided to the patient with some educational information regarding their diagnosis as well a list of reasons why they would want to return to the ER prior to their follow-up appointment should their condition change.     Mamadou Santos MD

## 2020-03-26 NOTE — PERIOP NOTES
[address]  Endoscopy Preprocedure Instructions      1. On the day of your surgery, please report to registration located on the 2nd floor of the  [facility]. yes    2. You must have a responsible adult to drive you to the hospital, stay at the hospital during your procedure and drive you home. If they leave your procedure will not be started (no exceptions). yes    3. Do not have anything to eat or drink (including water, gum, mints, coffee, and juice) after midnight. This does not apply to the medications you were instructed to take by your physician. yesIf you are currently taking Plavix, Coumadin, Aspirin, or other blood-thinning agents, contact your physician for special instructions. not applicable,    4. If you are having a procedure that requires bowel prep: We recommend that you have only clear liquids the day before your procedure and begin your bowel prep by 5:00 pm.  You may continue to drink clear liquids until midnight. If for any reason you are not able to complete your prep please notify your physician immediately. yes    5. Have a list of all current medications, including vitamins, herbal supplements and any other over the counter medications. yes    6. If you wear glasses, contacts, dentures and/or hearing aids, they may be removed prior to procedure, please bring a case to store them in. yes    7. You should understand that if you do not follow these instructions your procedure may be cancelled. If your physical condition changes (I.e. fever, cold or flu) please contact your doctor as soon as possible. 8. It is important that you be on time.   If for any reason you are unable to keep your appointment please call [phone] the day of or your physicians office prior to your scheduled procedure

## 2020-03-27 ENCOUNTER — ANESTHESIA EVENT (OUTPATIENT)
Dept: ENDOSCOPY | Age: 75
End: 2020-03-27
Payer: MEDICARE

## 2020-03-27 ENCOUNTER — ANESTHESIA (OUTPATIENT)
Dept: ENDOSCOPY | Age: 75
End: 2020-03-27
Payer: MEDICARE

## 2020-03-27 ENCOUNTER — HOSPITAL ENCOUNTER (OUTPATIENT)
Age: 75
Setting detail: OUTPATIENT SURGERY
Discharge: HOME | End: 2020-03-27
Attending: INTERNAL MEDICINE | Admitting: INTERNAL MEDICINE
Payer: MEDICARE

## 2020-03-27 VITALS
BODY MASS INDEX: 19.84 KG/M2 | OXYGEN SATURATION: 100 % | RESPIRATION RATE: 16 BRPM | WEIGHT: 107.81 LBS | DIASTOLIC BLOOD PRESSURE: 66 MMHG | SYSTOLIC BLOOD PRESSURE: 140 MMHG | TEMPERATURE: 98.1 F | HEART RATE: 63 BPM | HEIGHT: 62 IN

## 2020-03-27 PROCEDURE — 74011250636 HC RX REV CODE- 250/636: Performed by: NURSE ANESTHETIST, CERTIFIED REGISTERED

## 2020-03-27 PROCEDURE — 88304 TISSUE EXAM BY PATHOLOGIST: CPT

## 2020-03-27 PROCEDURE — 76040000019: Performed by: INTERNAL MEDICINE

## 2020-03-27 PROCEDURE — 77030021593 HC FCPS BIOP ENDOSC BSC -A: Performed by: INTERNAL MEDICINE

## 2020-03-27 PROCEDURE — 76060000031 HC ANESTHESIA FIRST 0.5 HR: Performed by: INTERNAL MEDICINE

## 2020-03-27 PROCEDURE — 88305 TISSUE EXAM BY PATHOLOGIST: CPT

## 2020-03-27 RX ORDER — ATROPINE SULFATE 0.1 MG/ML
0.4 INJECTION INTRAVENOUS
Status: DISCONTINUED | OUTPATIENT
Start: 2020-03-27 | End: 2020-03-27 | Stop reason: HOSPADM

## 2020-03-27 RX ORDER — NALOXONE HYDROCHLORIDE 0.4 MG/ML
0.4 INJECTION, SOLUTION INTRAMUSCULAR; INTRAVENOUS; SUBCUTANEOUS
Status: DISCONTINUED | OUTPATIENT
Start: 2020-03-27 | End: 2020-03-27 | Stop reason: HOSPADM

## 2020-03-27 RX ORDER — PROPOFOL 10 MG/ML
INJECTION, EMULSION INTRAVENOUS AS NEEDED
Status: DISCONTINUED | OUTPATIENT
Start: 2020-03-27 | End: 2020-03-27 | Stop reason: HOSPADM

## 2020-03-27 RX ORDER — PROPOFOL 10 MG/ML
INJECTION, EMULSION INTRAVENOUS
Status: DISCONTINUED | OUTPATIENT
Start: 2020-03-27 | End: 2020-03-27 | Stop reason: HOSPADM

## 2020-03-27 RX ORDER — SODIUM CHLORIDE 9 MG/ML
50 INJECTION, SOLUTION INTRAVENOUS CONTINUOUS
Status: DISCONTINUED | OUTPATIENT
Start: 2020-03-27 | End: 2020-03-27 | Stop reason: HOSPADM

## 2020-03-27 RX ORDER — EPINEPHRINE 0.1 MG/ML
1 INJECTION INTRACARDIAC; INTRAVENOUS
Status: DISCONTINUED | OUTPATIENT
Start: 2020-03-27 | End: 2020-03-27 | Stop reason: HOSPADM

## 2020-03-27 RX ORDER — FLUMAZENIL 0.1 MG/ML
0.2 INJECTION INTRAVENOUS
Status: DISCONTINUED | OUTPATIENT
Start: 2020-03-27 | End: 2020-03-27 | Stop reason: HOSPADM

## 2020-03-27 RX ORDER — MIDAZOLAM HYDROCHLORIDE 1 MG/ML
.25-5 INJECTION, SOLUTION INTRAMUSCULAR; INTRAVENOUS
Status: DISCONTINUED | OUTPATIENT
Start: 2020-03-27 | End: 2020-03-27 | Stop reason: HOSPADM

## 2020-03-27 RX ORDER — DEXTROMETHORPHAN/PSEUDOEPHED 2.5-7.5/.8
1.2 DROPS ORAL
Status: DISCONTINUED | OUTPATIENT
Start: 2020-03-27 | End: 2020-03-27 | Stop reason: HOSPADM

## 2020-03-27 RX ADMIN — PROPOFOL 140 MCG/KG/MIN: 10 INJECTION, EMULSION INTRAVENOUS at 11:42

## 2020-03-27 RX ADMIN — PROPOFOL 40 MG: 10 INJECTION, EMULSION INTRAVENOUS at 11:42

## 2020-03-27 NOTE — DISCHARGE INSTRUCTIONS
[address] [facility]. Conrado Garcia M.D.  [phone]            COLON DISCHARGE INSTRUCTIONS       3/27/2020    Kristy Morales  :  1945  [facility] Medical Record Number:  [id number]      COLONOSCOPY FINDINGS:  Your colonoscopy showed: mucosa within normal, mild diverticulosis and small internal hemorrhoids, otherwise no polyps, inflammation or lesions seen. DISCOMFORT:  Redness at IV site- apply warm compress to area; if redness or soreness persist- contact your physician  There may be a slight amount of blood passed from the rectum  Gaseous discomfort- walking, belching will help relieve any discomfort  You may not operate a vehicle for 12 hours  You may not engage in an occupation involving machinery or appliances for rest of today  You may not drink alcoholic beverages for at least 12 hours  Avoid making any critical decisions for at least 24 hour  DIET:   Low fat diet. - however -  remember your colon is empty and a heavy meal will produce gas. Avoid these foods:  vegetables, fried / greasy foods, carbonated drinks for today     ACTIVITY:  You may resume your normal daily activities it is recommended that you spend the remainder of the day resting -  avoid any strenuous activity. CALL M.D. ANY SIGN OF:   Increasing pain, nausea, vomiting  Abdominal distension (swelling)  New increased bleeding (oral or rectal)  Fever (chills)  Pain in chest area  Bloody discharge from nose or mouth   Shortness of breath    Follow-up Instructions:   Call Dr. Debbi De Jesus if any questions or problems. Telephone # [phone]  Biopsy results will be available in  5 to 7 days. Add fiber supplement daily.

## 2020-03-27 NOTE — PROGRESS NOTES
Bedside and Verbal shift change report given to moore,rn (oncoming nurse) by Benson Ala (offgoing nurse). Report included the following information SBAR.

## 2020-03-27 NOTE — PROGRESS NOTES
Ish Hanson  1945  [id number]    Situation:  Verbal report received from: April, Rn   Procedure: Procedure(s):  COLONOSCOPY (ESSENTIAL)  COLON BIOPSY    Background:    Preoperative diagnosis: DIVERTICULITIS  Postoperative diagnosis: mild diverticulosis  random colon bx  hemorrhoids    :  Dr. Nay Cam   Assistant(s): Endoscopy Technician-1: Jeimy Steve  Endoscopy RN-1: Felice Resendiz April    Specimens:   ID Type Source Tests Collected by Time Destination   1 : random colon bx Preservative   Margarita Farris MD 3/27/2020 1153 Pathology     H. Pylori  no    Assessment:  Intra-procedure medications       Anesthesia gave intra-procedure sedation and medications, see anesthesia flow sheet yes    Intravenous fluids: NS@ KVO     Vital signs stable   yes    Abdominal assessment: round and soft   yes    Recommendation:  Discharge patient per MD order  yes.   Return to floor  Outpatient   Family or Friend  Daughter   Permission to share finding with family or friend yes

## 2020-03-27 NOTE — ANESTHESIA POSTPROCEDURE EVALUATION
Procedure(s):  COLONOSCOPY (ESSENTIAL)  COLON BIOPSY. MAC    Anesthesia Post Evaluation      Multimodal analgesia: multimodal analgesia not used between 6 hours prior to anesthesia start to PACU discharge  Patient location during evaluation: bedside  Patient participation: complete - patient participated  Level of consciousness: awake  Pain management: adequate  Anesthetic complications: no  Cardiovascular status: acceptable  Respiratory status: acceptable  Hydration status: acceptable  Post anesthesia nausea and vomiting:  controlled      Vitals Value Taken Time   /86 3/27/2020 12:22 PM   Temp 36.7 °C (98.1 °F) 3/27/2020 12:02 PM   Pulse 67 3/27/2020 12:26 PM   Resp 12 3/27/2020 12:26 PM   SpO2 100 % 3/27/2020 12:26 PM   Vitals shown include unvalidated device data.

## 2020-03-27 NOTE — PROGRESS NOTES
Colonoscopy Procedure being performed under MAC; JENNA Randolph at bedside monitoring patient. See anesthesia notes. Endoscope was pre-cleaned at bedside immediately following procedure by Wilfred Padilla. Care of patient assumed from the anesthesia provider. Patient tolerated procedure well. Abdomen remains soft and non tender post procedure, no complaints or indication of discomfort noted at this time. See anesthesia note. Patient transferred to Endoscopy Recovery and report given to recovery nurse [facility]. Note:  Prior to procedure patient dtr went and got remote for brain stimulator and was able to turn it off prior to procedure. Also discussed med list with Dr. Reymundo Beck prior to procedure and he was aware and okay that we were unable to verify when meds were last taken with assisted living facility and the fact that patient was unclear of which meds she took.

## 2020-03-27 NOTE — H&P
The patient is a 76year old female who presents with a complaint of Diarrhea. Note for \"Diarrhea\": 76year old female presents for follow up post ER treatment of Diverticulitis.  Symptoms started 2-3 months ago with diffuse abd pain and blood in stool.   Denies n/v or fever.  Denies recent travel or sick contacts. Tommy Denson in ER 1/20/20 after having a single episode of bright red blood in stool.  CT showed thickening of descending colon.  Treated with Cipro 500 BID, Flagyl 500 BID, Zofran 4mg Q8prn  She reports abdominal pain and blood in stool is fully resolved. She has a history of chronic diarrhea which she has used fiber, Bentyl, and Imodium in the past.   Reports having 2-3 BM daily.  Stool is often liquid.  Denies dark or tarry stool.  Denies weight change. Labs 1/20/20 FOB +, Hgb 13.3  Abd Pelvic CT w cont 1/20/20 thickened wall of descending colon with possible inflammation  Colonoscopy 10/20/11 showed Diverticula and int hemorrhoid    She lives at [address] [place] [address]. Patient has cognitive deficit which impairs her ability to provide history.  Daughter in the office to assist with history.      Denies family history of colon cancer. Followed by Neurologist Dr Silvia Valentin for Familial Tremor and Cognitive Deficit. Hosey Carrel has a Deep Brain Stimulator in place. Tobacco none  Alcohol rare    Does not work    No anticoagulants.  No blood sugars. Problem List/Past Medical (MARISELA Perry; 2/12/2020 9:20 AM)  No Known Problems  [02/12/2020]: (Marked as Inactive)    Allergies Renelda Rail. Boodoo; 2/12/2020 8:53 AM)  No Known Drug Allergies  [02/12/2020]:    Health Maintenance History Polo Zhang; 2/12/2020 8:53 AM)  Flu Vaccine   yes  Pneumovax   unknown        Review of Systems Polo Zhang; 2/12/2020 8:49 AM)  General Not Present- Chronic Fatigue, Poor Appetite, Weight Gain and Weight Loss. Skin Not Present- Itching, Rash and Skin Color Changes.   HEENT Not Present- Hearing Loss and Vertigo. Respiratory Not Present- Difficulty Breathing and TB exposure. Cardiovascular Not Present- Chest Pain, Use of Antibiotics before Dental Procedures and Use of Blood Thinners. Gastrointestinal Present- See HPI. Musculoskeletal Not Present- Arthritis, Hip Replacement Surgery and Knee Replacement Surgery. Neurological Not Present- Weakness. Psychiatric Not Present- Depression. Endocrine Not Present- Diabetes and Thyroid Problems. Hematology Not Present- Anemia. Vitals Traci Rodriguez; 2/12/2020 8:47 AM)  2/12/2020 8:47 AM  Weight: 111.5 lb   Height: 62 in   Body Surface Area: 1.49 m²   Body Mass Index: 20.39 kg/m²    BP: 128/80 (Sitting, Left Arm, Standard)              Physical Exam (Lita ANTONIO; 2/12/2020 9:29 AM)  General  Mental Status - Confused. General Appearance - Cooperative, Pleasant, Not in acute distress. Orientation - Oriented X3. Build & Nutrition - Well nourished and Well developed. Voice - Normal.    Integumentary  General Characteristics  Overall examination of the patient's skin reveals - no rashes and no bruises. Color - normal coloration of skin. Head and Neck  Neck  Global Assessment - full range of motion and supple, non-tender, No lymphadenopathy. Thyroid  Gland Characteristics - normal size and consistency. Eye  Sclera/Conjunctiva - Left - No Jaundice. Sclera/Conjunctiva - Right - No Jaundice. Pupil - Left - Normal, Accommodating, Direct reaction to light normal and Equal.  Pupil - Right - Normal, Accommodating, Direct reaction to light normal and Equal.    ENMT  Mouth and Throat  Oral Cavity/Oropharynx - Oropharynx - Normal.    Chest and Lung Exam  Chest and lung exam reveals  - normal excursion with symmetric chest walls, quiet, even and easy respiratory effort with no use of accessory muscles and on auscultation, normal breath sounds, no adventitious sounds and normal vocal resonance.     Cardiovascular  Cardiovascular examination reveals  - on palpation PMI is normal in location and amplitude, no palpable S3 or S4. Normal cardiac borders. .  Auscultation  Heart Sounds - S1 WNL and S2 WNL. Murmurs & Other Heart Sounds - Auscultation of the heart reveals - No Murmurs. Abdomen  Inspection  Inspection of the abdomen reveals - Non-distended. Incisional scars - No incisional scars. Palpation/Percussion  Tenderness - Non-Tender. Rebound tenderness - No rebound. Liver - Normal size palpable at right costal margin. Spleen - Other Characteristics - Non Palpable. Abdominal Mass Palpable - No masses. Other Characteristics - No Ascites. Organomegally - None. Auscultation  Auscultation of the abdomen reveals - Bowel sounds normal.    Rectal - Did not examine. Neurologic  Mental Status  Affect - appropriate. Speech - Normal. Cognitive function - aware of current events. Neuropsychiatric  The patient's mood and affect are described as  - full, not anxious, not depressed. Musculoskeletal  Global Assessment  Examination of related systems reveals - no digital clubbing or cyanosis. Gait and Station - normal gait and station. Assessment & Plan ([facility]. [facility]; 2/12/2020 9:33 AM)  Hematochezia (578.1  K92.1)  Story: Labs 1/20/20 FOB +, Hgb 13.3  Abd Pelvic CT w cont 1/20/20 thickened wall of descending colon with possible inflammation  Colonoscopy 10/20/11 showed Diverticula and int hemorrhoid  Impression: Will repeat colonoscopy to rule out hemorrhoid, fissure, polyp, IBD, or microscopic colitis which may be causing blood in stool and diarrhea. Current Plans  Colonoscopy (81293) (Discussed risks and benefits with the patient to include:; perforation, post polypectomy, or post biopsy bleeding, missed lesions, and sedation reactions.)  Started Suprep Bowel Prep Kit 17.5-3.13-1.6GM/177ML, 1 (one) KIt container Milliliter as directed, 364 Milliliter, 02/12/2020, No Refill.   Diarrhea (787.91  R19.7)  Impression: Will rule out infectious causes of diarrhea in this patient who has been in the hospital, rehab and now assisted living over the past year. Will also rule out EPI, Celiac, or IBD as the cause of persistent diarrhea. She may continue with fiber supplement daily and Imodium as needed. If symptoms remain unclear we may discuss a possible diet of elimination. Current Plans  Assay Of Calprotectin Fecal (90895)  Clostridium difficile Toxin A+B, EIA (05732)  Culture, Stool (86115)  ASSAY, ELASTASE, PANCREATIC, FECAL (54659)  CELIAC DISEASE, COMP (53481)  Familial tremor (333.1  G25.0)  Story: Followed by Neurologist Dr Kareen Vieira for Familial Tremor and Cognitive Deficit. She has a Deep Brain Stimulator in place. Impression: Neurology clearanance to be faxed. Current Plans  Pt Education - How to access health information online: discussed with patient and provided information. Patient is to call me for any questions or concerns.   Follow up office visit in 6 weeks  Signed electronically by MARISELA Chisholm (2/12/2020 9:34 AM)

## 2020-03-27 NOTE — PROCEDURES
Tayo Matias M.D.  [phone]            3/27/2020          Colonoscopy Operative Report  Saad Solis  :  1945  [facility] Medical Record Number:  [id number]      Indications:    Abdominal pain, LLQ, Diarrhea, Lower rectal bleeding     :  Pablo Harrington MD    Referring Provider: Violetta Kwong MD    Sedation:  MAC anesthesia    Pre-Procedural Exam:      Airway: clear,  No airway problems anticipated  Heart: RRR, without gallops or rubs  Lungs: clear bilaterally without wheezes, crackles, or rhonchi  Abdomen: soft, nontender, nondistended, bowel sounds present  Mental Status: awake, alert and oriented to person, place and time     Procedure Details:  After informed consent was obtained with all risks and benefits of procedure explained and preoperative exam completed, the patient was taken to the endoscopy suite and placed in the left lateral decubitus position. Upon sequential sedation as per above, a digital rectal exam was performed. The Olympus videocolonoscope  was inserted in the rectum and carefully advanced to the cecum, which was identified by the ileocecal valve and appendiceal orifice. The quality of preparation was good. The colonoscope was slowly withdrawn with careful inspection and evaluation between folds. Retroflexion in the rectum was performed. Findings:   Terminal Ileum: not intubated  Cecum: normal  Ascending Colon: normal, mild diverticulosis  Transverse Colon: normal  Descending Colon: normal  Sigmoid: no mucosal lesion appreciated  mild diverticulosis; Rectum: no mucosal lesion appreciated  Grade 1 internal hemorrhoid(s); Interventions:  none    Specimen Removed:  Random colon biopsies    Complications: None. EBL:  None. Impression:  Mild diverticulosis and small internal hemorrhoids, otherwise mucosa within normal.    Recommendations:  -Await pathology.   -Low fat diet.   Daily fiber supplements  -Resume normal medication(s). -Follow-up telehealth visit if needed. Discharge Disposition:  Home in the company of a  when able to ambulate.     Jermaine Wolff MD  3/27/2020  12:01 PM

## 2020-03-27 NOTE — ANESTHESIA PREPROCEDURE EVALUATION
Relevant Problems   No relevant active problems       Anesthetic History   No history of anesthetic complications            Review of Systems / Medical History  Patient summary reviewed, nursing notes reviewed and pertinent labs reviewed    Pulmonary            Asthma        Neuro/Psych         Psychiatric history (ANXIETY/DEPRESSION) and dementia    Comments: TREMOR - HAS DBS Cardiovascular    Hypertension              Exercise tolerance: >4 METS     GI/Hepatic/Renal                Endo/Other      Hypothyroidism       Other Findings              Physical Exam    Airway  Mallampati: II  TM Distance: 4 - 6 cm  Neck ROM: normal range of motion   Mouth opening: Normal     Cardiovascular    Rhythm: regular  Rate: normal         Dental  No notable dental hx       Pulmonary  Breath sounds clear to auscultation               Abdominal  GI exam deferred       Other Findings            Anesthetic Plan    ASA: 3  Anesthesia type: MAC          Induction: Intravenous  Anesthetic plan and risks discussed with: Patient      DISCUSSED WITH [employer] REP AND SUGGESTED \"TURNING OFF\" THE DEVICE

## 2022-03-18 PROBLEM — E78.5 HYPERLIPIDEMIA: Status: ACTIVE | Noted: 2018-12-08
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
## 2022-03-19 PROBLEM — E03.9 ACQUIRED HYPOTHYROIDISM: Status: ACTIVE | Noted: 2018-12-08

## 2022-03-19 PROBLEM — E87.1 HYPONATREMIA: Status: ACTIVE | Noted: 2018-12-08

## 2022-03-20 PROBLEM — R26.81 UNSTEADY GAIT: Status: ACTIVE | Noted: 2018-12-08

## 2022-05-04 ENCOUNTER — TRANSCRIBE ORDER (OUTPATIENT)
Dept: SCHEDULING | Age: 77
End: 2022-05-04

## 2022-05-04 DIAGNOSIS — S22.070A CLOSED WEDGE COMPRESSION FRACTURE OF T9 VERTEBRA, INITIAL ENCOUNTER (HCC): Primary | ICD-10-CM

## 2022-11-09 ENCOUNTER — HOSPITAL ENCOUNTER (EMERGENCY)
Age: 77
Discharge: HOME | End: 2022-11-09
Attending: EMERGENCY MEDICINE
Payer: MEDICARE

## 2022-11-09 ENCOUNTER — APPOINTMENT (OUTPATIENT)
Dept: GENERAL RADIOLOGY | Age: 77
End: 2022-11-09
Attending: EMERGENCY MEDICINE
Payer: MEDICARE

## 2022-11-09 ENCOUNTER — APPOINTMENT (OUTPATIENT)
Dept: CT IMAGING | Age: 77
End: 2022-11-09
Attending: EMERGENCY MEDICINE
Payer: MEDICARE

## 2022-11-09 VITALS
OXYGEN SATURATION: 97 % | BODY MASS INDEX: 18.4 KG/M2 | HEIGHT: 62 IN | TEMPERATURE: 98.3 F | WEIGHT: 100 LBS | RESPIRATION RATE: 18 BRPM | SYSTOLIC BLOOD PRESSURE: 151 MMHG | DIASTOLIC BLOOD PRESSURE: 79 MMHG | HEART RATE: 74 BPM
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
## 2022-11-09 DIAGNOSIS — S43.402A SPRAIN OF LEFT SHOULDER, UNSPECIFIED SHOULDER SPRAIN TYPE, INITIAL ENCOUNTER: ICD-10-CM

## 2022-11-09 DIAGNOSIS — S09.90XA CLOSED HEAD INJURY, INITIAL ENCOUNTER: ICD-10-CM

## 2022-11-09 PROCEDURE — 74011250637 HC RX REV CODE- 250/637: Performed by: EMERGENCY MEDICINE

## 2022-11-09 PROCEDURE — 72125 CT NECK SPINE W/O DYE: CPT

## 2022-11-09 PROCEDURE — 73030 X-RAY EXAM OF SHOULDER: CPT

## 2022-11-09 PROCEDURE — 99284 EMERGENCY DEPT VISIT MOD MDM: CPT

## 2022-11-09 PROCEDURE — 70450 CT HEAD/BRAIN W/O DYE: CPT

## 2022-11-09 RX ORDER — LOPERAMIDE HYDROCHLORIDE 2 MG/1
2 CAPSULE ORAL
Status: COMPLETED | OUTPATIENT
Start: 2022-11-09 | End: 2022-11-09

## 2022-11-09 RX ORDER — ACETAMINOPHEN 325 MG/1
650 TABLET ORAL
Status: COMPLETED | OUTPATIENT
Start: 2022-11-09 | End: 2022-11-09

## 2022-11-09 RX ADMIN — ACETAMINOPHEN 650 MG: 325 TABLET, FILM COATED ORAL at 13:50

## 2022-11-09 RX ADMIN — LOPERAMIDE HYDROCHLORIDE 2 MG: 2 CAPSULE ORAL at 13:49

## 2022-11-09 NOTE — ED PROVIDER NOTES
27-year-old female presents from [address] [place] [address] assisted living after a ground-level fall. Patient states her walker gave out and she fell sideways into a shower. She does reporting hitting the left side of her head but denies any loss of consciousness. She is also complaining of some neck pain and left shoulder pain. No pain medications taken prior to arrival.  No vomiting. Does not take any blood thinning medications. Patient adds that she been having some abdominal cramping and diarrhea for the past few days. No other complaints at this time. Past Medical History:   Diagnosis Date    Asthma     as a child    Hypercholesterolemia     Hypertension     Occasional tremors     \"essential tremors with implant in chest on Left side under collar bone connected to the brain\"    Osteopenia     Psychiatric disorder     anxiety    Thyroid disease     hypothryroidism       Past Surgical History:   Procedure Laterality Date    ABDOMEN SURGERY PROC UNLISTED  1981    partial abdominal Hysterectomy, bladder tacking,    COLONOSCOPY N/A 3/27/2020    COLONOSCOPY (ESSENTIAL) performed by Aaron Doss MD at [facility]    HX APPENDECTOMY      HX BUNIONECTOMY      HX ORTHOPAEDIC  1986    neck fusion,    HX ORTHOPAEDIC Right     Bunionectomy    HX PACEMAKER      Deep brain stim Implanted device to L chest [facility] for essential tremors    HX TONSILLECTOMY      NEUROLOGICAL PROCEDURE UNLISTED      implant for tremors-medtronic    TOTAL ABDOM HYSTERECTOMY  1981         No family history on file. Social History     Socioeconomic History    Marital status: SINGLE     Spouse name: Not on file    Number of children: Not on file    Years of education: Not on file    Highest education level: Not on file   Occupational History    Not on file   Tobacco Use    Smoking status: Never    Smokeless tobacco: Never   Substance and Sexual Activity    Alcohol use:  Yes     Alcohol/week: 4.2 standard drinks Types: 5 Glasses of wine per week     Comment: 5-6 wine/week    Drug use: No    Sexual activity: Not Currently     Partners: Male     Birth control/protection: Surgical     Comment: SALUD   Other Topics Concern    Not on file   Social History Narrative    ** Merged History Encounter **          Social Determinants of Health     Financial Resource Strain: Not on file   Food Insecurity: Not on file   Transportation Needs: Not on file   Physical Activity: Not on file   Stress: Not on file   Social Connections: Not on file   Intimate Partner Violence: Not on file   Housing Stability: Not on file         ALLERGIES: Latex, natural rubber and Latex    Review of Systems   Constitutional:  Negative for fever. HENT:  Negative for facial swelling. Eyes:  Negative for visual disturbance. Respiratory:  Negative for chest tightness. Cardiovascular:  Negative for chest pain. Gastrointestinal:  Negative for abdominal pain. Genitourinary:  Negative for difficulty urinating and dysuria. Musculoskeletal:  Negative for arthralgias. Skin:  Negative for rash. Neurological:  Negative for headaches. Hematological:  Negative for adenopathy. Psychiatric/Behavioral:  Negative for suicidal ideas. Vitals:    11/09/22 1313   BP: (!) 153/83   Pulse: 74   Resp: 18   Temp: 98.3 °F (36.8 °C)   Weight: 45.4 kg (100 lb)   Height: 5' 2\" (1.575 m)            Physical Exam  Vitals and nursing note reviewed. Constitutional:       General: She is not in acute distress. Appearance: She is well-developed. HENT:      Head: Normocephalic and atraumatic. Eyes:      General: No scleral icterus. Conjunctiva/sclera: Conjunctivae normal.      Pupils: Pupils are equal, round, and reactive to light. Cardiovascular:      Rate and Rhythm: Normal rate. Heart sounds: No murmur heard. Pulmonary:      Effort: Pulmonary effort is normal. No respiratory distress. Abdominal:      General: There is no distension. Musculoskeletal:         General: Normal range of motion. Cervical back: Normal range of motion and neck supple. Comments: Tenderness to palpation over the left shoulder with limited range of motion secondary to pain. No obvious deformities. Normal pulses. Skin:     General: Skin is warm and dry. Findings: No rash. Neurological:      Mental Status: She is alert and oriented to person, place, and time. MDM  Number of Diagnoses or Management Options  Closed head injury, initial encounter  Fall from ground level  Sprain of left shoulder, unspecified shoulder sprain type, initial encounter  Diagnosis management comments: Assessment: Patient here with injuries after a ground-level fall. Imaging of her head, C-spine, left shoulder were unremarkable for acute injury. Patient is resting comfortably with stable vital signs. Plan to discharge home with symptomatic treatment. She can return to the ER with any worsening symptoms.        Amount and/or Complexity of Data Reviewed  Tests in the radiology section of CPT®: reviewed           Procedures

## 2022-11-09 NOTE — ED TRIAGE NOTES
Patient arrives via EMS from [facility] for a [facility]. Patient was walking with walker and tripped and fell into shower, hit head, denies LOC. Also endorses left shoulder pain.      Denies blood thinner use

## 2023-05-10 NOTE — PROGRESS NOTES
Discharge instructions were reviewed with patient. All questions were answered. IV and heart monitor were removed. Patient received a copy of her discharge papers and will be discharged to [place] [address]. She will be transported via wheelchair and staff. Problem: Chronic Conditions and Co-morbidities  Goal: Patient's chronic conditions and co-morbidity symptoms are monitored and maintained or improved  Outcome: Progressing     Problem: Discharge Planning  Goal: Discharge to home or other facility with appropriate resources  Outcome: Progressing     Problem: Wound:  Goal: Will show signs of wound healing; wound closure and no evidence of infection  Description: Will show signs of wound healing; wound closure and no evidence of infection  Outcome: Progressing     Problem: Arterial:  Goal: Optimize blood flow for wound healing  Description: Optimize blood flow for wound healing  Outcome: Progressing     Problem: Falls - Risk of:  Goal: Will remain free from falls  Description: Will remain free from falls  Outcome: Progressing

## 2025-04-11 ENCOUNTER — HOSPITAL ENCOUNTER (EMERGENCY)
Facility: HOSPITAL | Age: 80
Discharge: HOME | End: 2025-04-11
Attending: EMERGENCY MEDICINE
Payer: MEDICARE

## 2025-04-11 ENCOUNTER — APPOINTMENT (OUTPATIENT)
Facility: HOSPITAL | Age: 80
End: 2025-04-11
Payer: MEDICARE

## 2025-04-11 VITALS
HEART RATE: 62 BPM | TEMPERATURE: 98.2 F | DIASTOLIC BLOOD PRESSURE: 64 MMHG | SYSTOLIC BLOOD PRESSURE: 111 MMHG | OXYGEN SATURATION: 95 % | RESPIRATION RATE: 16 BRPM

## 2025-04-11 DIAGNOSIS — R20.2 PARESTHESIAS: Primary | ICD-10-CM

## 2025-04-11 LAB
ALBUMIN SERPL-MCNC: 3.7 G/DL (ref 3.5–5)
ALBUMIN/GLOB SERPL: 1.2 (ref 1.1–2.2)
ALP SERPL-CCNC: 62 U/L (ref 45–117)
ALT SERPL-CCNC: 36 U/L (ref 12–78)
ANION GAP SERPL CALC-SCNC: 10 MMOL/L (ref 2–12)
AST SERPL-CCNC: 32 U/L (ref 15–37)
BASOPHILS # BLD: 0.05 K/UL (ref 0–0.1)
BASOPHILS NFR BLD: 0.7 % (ref 0–1)
BILIRUB SERPL-MCNC: 0.3 MG/DL (ref 0.2–1)
BUN SERPL-MCNC: 24 MG/DL (ref 6–20)
BUN/CREAT SERPL: 36 (ref 12–20)
CALCIUM SERPL-MCNC: 8.6 MG/DL (ref 8.5–10.1)
CHLORIDE SERPL-SCNC: 98 MMOL/L (ref 97–108)
CO2 SERPL-SCNC: 27 MMOL/L (ref 21–32)
CREAT SERPL-MCNC: 0.67 MG/DL (ref 0.55–1.02)
DIFFERENTIAL METHOD BLD: ABNORMAL
EOSINOPHIL # BLD: 0.1 K/UL (ref 0–0.4)
EOSINOPHIL NFR BLD: 1.3 % (ref 0–7)
ERYTHROCYTE [DISTWIDTH] IN BLOOD BY AUTOMATED COUNT: 13 % (ref 11.5–14.5)
GLOBULIN SER CALC-MCNC: 3.1 G/DL (ref 2–4)
GLUCOSE SERPL-MCNC: 90 MG/DL (ref 65–100)
HCT VFR BLD AUTO: 32 % (ref 35–47)
HGB BLD-MCNC: 11 G/DL (ref 11.5–16)
IMM GRANULOCYTES # BLD AUTO: 0.06 K/UL (ref 0–0.04)
IMM GRANULOCYTES NFR BLD AUTO: 0.8 % (ref 0–0.5)
LYMPHOCYTES # BLD: 1.43 K/UL (ref 0.8–3.5)
LYMPHOCYTES NFR BLD: 18.8 % (ref 12–49)
MCH RBC QN AUTO: 28.3 PG (ref 26–34)
MCHC RBC AUTO-ENTMCNC: 34.4 G/DL (ref 30–36.5)
MCV RBC AUTO: 82.3 FL (ref 80–99)
MONOCYTES # BLD: 0.69 K/UL (ref 0–1)
MONOCYTES NFR BLD: 9.1 % (ref 5–13)
NEUTS SEG # BLD: 5.26 K/UL (ref 1.8–8)
NEUTS SEG NFR BLD: 69.3 % (ref 32–75)
NRBC # BLD: 0 K/UL (ref 0–0.01)
NRBC BLD-RTO: 0 PER 100 WBC
PLATELET # BLD AUTO: 199 K/UL (ref 150–400)
PMV BLD AUTO: 9.4 FL (ref 8.9–12.9)
POTASSIUM SERPL-SCNC: 3.6 MMOL/L (ref 3.5–5.1)
PROT SERPL-MCNC: 6.8 G/DL (ref 6.4–8.2)
RBC # BLD AUTO: 3.89 M/UL (ref 3.8–5.2)
SODIUM SERPL-SCNC: 135 MMOL/L (ref 136–145)
TROPONIN I SERPL HS-MCNC: 5 NG/L (ref 0–51)
WBC # BLD AUTO: 7.6 K/UL (ref 3.6–11)

## 2025-04-11 PROCEDURE — 36415 COLL VENOUS BLD VENIPUNCTURE: CPT

## 2025-04-11 PROCEDURE — 85025 COMPLETE CBC W/AUTO DIFF WBC: CPT

## 2025-04-11 PROCEDURE — 70450 CT HEAD/BRAIN W/O DYE: CPT

## 2025-04-11 PROCEDURE — 80053 COMPREHEN METABOLIC PANEL: CPT

## 2025-04-11 PROCEDURE — 93005 ELECTROCARDIOGRAM TRACING: CPT | Performed by: EMERGENCY MEDICINE

## 2025-04-11 PROCEDURE — 84484 ASSAY OF TROPONIN QUANT: CPT

## 2025-04-11 PROCEDURE — 99284 EMERGENCY DEPT VISIT MOD MDM: CPT

## 2025-04-11 ASSESSMENT — PAIN - FUNCTIONAL ASSESSMENT: PAIN_FUNCTIONAL_ASSESSMENT: NONE - DENIES PAIN

## 2025-04-11 NOTE — ED TRIAGE NOTES
Pt to room via wheelchair. Pt reports she had ~ 1 minute of tingling in her right hand yesterday. Symptoms resolved on their own. No complaints at this time.

## 2025-04-11 NOTE — ED PROVIDER NOTES
General: Bowel sounds are normal.   Musculoskeletal:         General: Normal range of motion.   Skin:     Comments: Fragile, bruising as noted above   Neurological:      General: No focal deficit present.         DIAGNOSTIC RESULTS     EKG: All EKG's are interpreted by the Emergency Department Physician who either signs or Co-signs this chart in the absence of a cardiologist.        RADIOLOGY:   Non-plain film images such as CT, Ultrasound and MRI are read by the radiologist. Plain radiographic images are visualized and preliminarily interpreted by the emergency physician with the below findings:        Interpretation per the Radiologist below, if available at the time of this note:    CT HEAD WO CONTRAST   Final Result   No evidence of acute process.            Electronically signed by CECELIA HAGEN           LABS:  Labs Reviewed   CBC WITH AUTO DIFFERENTIAL - Abnormal; Notable for the following components:       Result Value    Hemoglobin 11.0 (*)     Hematocrit 32.0 (*)     Immature Granulocytes % 0.8 (*)     Immature Granulocytes Absolute 0.06 (*)     All other components within normal limits   COMPREHENSIVE METABOLIC PANEL - Abnormal; Notable for the following components:    Sodium 135 (*)     BUN 24 (*)     BUN/Creatinine Ratio 36 (*)     All other components within normal limits   TROPONIN       All other labs were within normal range or not returned as of this dictation.    EMERGENCY DEPARTMENT COURSE and DIFFERENTIAL DIAGNOSIS/MDM:   Vitals:    Vitals:    04/11/25 1747 04/11/25 1837   BP: 133/68 111/64   Pulse: 64 62   Resp: 16    Temp: 98.2 °F (36.8 °C)    TempSrc: Oral    SpO2: 97% 95%           Medical Decision Making  79-year-old female presents emergency department chief complaint tingling in her right hand that started yesterday.  She has a history of tremors for which she has a nerve ulcer mechanism implanted.  She denies any chest pain shortness of breath.  She had also noted a bruise or red kobi to  Physician / Physician Assistant / Nurse Practitioner             Racquel Clark, DO  04/11/25 1952

## 2025-04-13 LAB
EKG DIAGNOSIS: NORMAL
EKG Q-T INTERVAL: 422 MS
EKG QRS DURATION: 66 MS
EKG QTC CALCULATION (BAZETT): 428 MS
EKG R AXIS: 44 DEGREES
EKG T AXIS: 81 DEGREES
EKG VENTRICULAR RATE: 62 BPM

## 2025-04-13 PROCEDURE — 93010 ELECTROCARDIOGRAM REPORT: CPT | Performed by: SPECIALIST

## (undated) DEVICE — CATH IV AUTOGRD BC PNK 20GA 25 -- INSYTE

## (undated) DEVICE — BASIN EMSIS 16OZ GRAPHITE PLAS KID SHP MOLD GRAD FOR ORAL

## (undated) DEVICE — SET ADMIN 16ML TBNG L100IN 2 Y INJ SITE IV PIGGY BK DISP

## (undated) DEVICE — ELECTRODE,RADIOTRANSLUCENT,FOAM,3PK: Brand: MEDLINE

## (undated) DEVICE — CONTAINER SPEC 20 ML LID NEUT BUFF FORMALIN 10 % POLYPR STS

## (undated) DEVICE — CANN NASAL O2 CAPNOGRAPHY AD -- FILTERLINE

## (undated) DEVICE — ADULT SPO2 SENSOR: Brand: NELLCOR

## (undated) DEVICE — 1200 GUARD II KIT W/5MM TUBE W/O VAC TUBE: Brand: GUARDIAN

## (undated) DEVICE — NDL PRT INJ NSAF BLNT 18GX1.5 --

## (undated) DEVICE — BAG SPEC BIOHZRD 10 X 10 IN --

## (undated) DEVICE — SOLIDIFIER MEDC 1200ML -- CONVERT TO 356117

## (undated) DEVICE — BAG BELONG PT PERS CLEAR HANDL

## (undated) DEVICE — SYRINGE 50ML E/T

## (undated) DEVICE — FORCEPS BX L240CM JAW DIA2.8MM L CAP W/ NDL MIC MESH TOOTH

## (undated) DEVICE — THE PARA-PAK SYSTEMS PROVIDE STANDARDIZED PROCEDURES FOR THE ROUTINE COLLECTION, TRANSPORTATION, PRESERVATION AND EXAMINATION OF STOOL SPECIMENS FOR INTESTINAL PARASITES. KIT SYSTEMS ARE DESIGNED FOR EASY USE BY INDIVIDUALS NOT TRAINED IN MICROBIOLOGICAL PROCEDURES AND AFFORD AN EXCELLENT MEANS OF MINIMIZING THE ADVERSE EFFECTS OF DELAY IN SPECIMEN TRANSPORT.: Brand: PARA-PAK LV-PVA / 10% FORMALIN / CLEAN

## (undated) DEVICE — Device

## (undated) DEVICE — KIT COLON W/ 1.1OZ LUB AND 2 END